# Patient Record
Sex: MALE | Race: WHITE | NOT HISPANIC OR LATINO
[De-identification: names, ages, dates, MRNs, and addresses within clinical notes are randomized per-mention and may not be internally consistent; named-entity substitution may affect disease eponyms.]

---

## 2017-05-23 ENCOUNTER — TRANSCRIPTION ENCOUNTER (OUTPATIENT)
Age: 42
End: 2017-05-23

## 2017-11-18 ENCOUNTER — TRANSCRIPTION ENCOUNTER (OUTPATIENT)
Age: 42
End: 2017-11-18

## 2018-06-05 ENCOUNTER — APPOINTMENT (OUTPATIENT)
Dept: NEUROLOGY | Facility: CLINIC | Age: 43
End: 2018-06-05
Payer: COMMERCIAL

## 2018-06-05 VITALS
SYSTOLIC BLOOD PRESSURE: 128 MMHG | HEIGHT: 68 IN | DIASTOLIC BLOOD PRESSURE: 70 MMHG | WEIGHT: 260 LBS | HEART RATE: 78 BPM | BODY MASS INDEX: 39.4 KG/M2

## 2018-06-05 DIAGNOSIS — M10.9 GOUT, UNSPECIFIED: ICD-10-CM

## 2018-06-05 DIAGNOSIS — Z78.9 OTHER SPECIFIED HEALTH STATUS: ICD-10-CM

## 2018-06-05 DIAGNOSIS — Z85.72 PERSONAL HISTORY OF NON-HODGKIN LYMPHOMAS: ICD-10-CM

## 2018-06-05 DIAGNOSIS — G47.33 OBSTRUCTIVE SLEEP APNEA (ADULT) (PEDIATRIC): ICD-10-CM

## 2018-06-05 DIAGNOSIS — E11.9 TYPE 2 DIABETES MELLITUS W/OUT COMPLICATIONS: ICD-10-CM

## 2018-06-05 DIAGNOSIS — Q85.9 PHAKOMATOSIS, UNSPECIFIED: ICD-10-CM

## 2018-06-05 PROCEDURE — 99204 OFFICE O/P NEW MOD 45 MIN: CPT

## 2018-08-03 ENCOUNTER — APPOINTMENT (OUTPATIENT)
Dept: RADIOLOGY | Facility: CLINIC | Age: 43
End: 2018-08-03

## 2018-08-05 ENCOUNTER — APPOINTMENT (OUTPATIENT)
Dept: MRI IMAGING | Facility: CLINIC | Age: 43
End: 2018-08-05

## 2018-08-11 ENCOUNTER — APPOINTMENT (OUTPATIENT)
Dept: RADIOLOGY | Facility: CLINIC | Age: 43
End: 2018-08-11

## 2018-08-11 ENCOUNTER — OUTPATIENT (OUTPATIENT)
Dept: OUTPATIENT SERVICES | Facility: HOSPITAL | Age: 43
LOS: 1 days | End: 2018-08-11
Payer: COMMERCIAL

## 2018-08-11 DIAGNOSIS — Z00.8 ENCOUNTER FOR OTHER GENERAL EXAMINATION: ICD-10-CM

## 2018-08-11 PROCEDURE — 77080 DXA BONE DENSITY AXIAL: CPT | Mod: 26

## 2018-08-11 PROCEDURE — 77080 DXA BONE DENSITY AXIAL: CPT

## 2018-08-19 ENCOUNTER — FORM ENCOUNTER (OUTPATIENT)
Age: 43
End: 2018-08-19

## 2018-08-20 ENCOUNTER — APPOINTMENT (OUTPATIENT)
Dept: MRI IMAGING | Facility: CLINIC | Age: 43
End: 2018-08-20
Payer: COMMERCIAL

## 2018-08-20 ENCOUNTER — OUTPATIENT (OUTPATIENT)
Dept: OUTPATIENT SERVICES | Facility: HOSPITAL | Age: 43
LOS: 1 days | End: 2018-08-20
Payer: COMMERCIAL

## 2018-08-20 DIAGNOSIS — Q85.9 PHAKOMATOSIS, UNSPECIFIED: ICD-10-CM

## 2018-08-20 PROCEDURE — 70553 MRI BRAIN STEM W/O & W/DYE: CPT

## 2018-08-20 PROCEDURE — 70553 MRI BRAIN STEM W/O & W/DYE: CPT | Mod: 26

## 2018-08-20 PROCEDURE — A9585: CPT

## 2019-06-04 ENCOUNTER — APPOINTMENT (OUTPATIENT)
Dept: NEUROLOGY | Facility: CLINIC | Age: 44
End: 2019-06-04

## 2020-04-25 ENCOUNTER — MESSAGE (OUTPATIENT)
Age: 45
End: 2020-04-25

## 2020-05-07 ENCOUNTER — APPOINTMENT (OUTPATIENT)
Dept: DISASTER EMERGENCY | Facility: CLINIC | Age: 45
End: 2020-05-07

## 2020-05-08 LAB
SARS-COV-2 IGG SERPL IA-ACNC: <0.1 INDEX
SARS-COV-2 IGG SERPL QL IA: NEGATIVE

## 2020-11-07 ENCOUNTER — TRANSCRIPTION ENCOUNTER (OUTPATIENT)
Age: 45
End: 2020-11-07

## 2021-12-05 ENCOUNTER — NON-APPOINTMENT (OUTPATIENT)
Age: 46
End: 2021-12-05

## 2021-12-09 ENCOUNTER — NON-APPOINTMENT (OUTPATIENT)
Age: 46
End: 2021-12-09

## 2021-12-09 ENCOUNTER — APPOINTMENT (OUTPATIENT)
Dept: OPHTHALMOLOGY | Facility: CLINIC | Age: 46
End: 2021-12-09
Payer: COMMERCIAL

## 2021-12-09 PROCEDURE — 92004 COMPRE OPH EXAM NEW PT 1/>: CPT

## 2021-12-09 PROCEDURE — 92133 CPTRZD OPH DX IMG PST SGM ON: CPT

## 2021-12-09 PROCEDURE — 92012 INTRM OPH EXAM EST PATIENT: CPT

## 2021-12-28 ENCOUNTER — TRANSCRIPTION ENCOUNTER (OUTPATIENT)
Age: 46
End: 2021-12-28

## 2021-12-28 ENCOUNTER — APPOINTMENT (OUTPATIENT)
Dept: VASCULAR SURGERY | Facility: CLINIC | Age: 46
End: 2021-12-28

## 2021-12-28 ENCOUNTER — LABORATORY RESULT (OUTPATIENT)
Age: 46
End: 2021-12-28

## 2021-12-28 DIAGNOSIS — J45.909 UNSPECIFIED ASTHMA, UNCOMPLICATED: ICD-10-CM

## 2022-09-02 ENCOUNTER — NON-APPOINTMENT (OUTPATIENT)
Age: 47
End: 2022-09-02

## 2022-10-13 ENCOUNTER — INPATIENT (INPATIENT)
Facility: HOSPITAL | Age: 47
LOS: 4 days | Discharge: HOME | DRG: 392 | End: 2022-10-18
Attending: SURGERY | Admitting: SURGERY
Payer: COMMERCIAL

## 2022-10-13 VITALS
WEIGHT: 241.41 LBS | OXYGEN SATURATION: 96 % | HEART RATE: 105 BPM | SYSTOLIC BLOOD PRESSURE: 115 MMHG | DIASTOLIC BLOOD PRESSURE: 80 MMHG | HEIGHT: 68 IN | RESPIRATION RATE: 20 BRPM | TEMPERATURE: 98 F

## 2022-10-13 DIAGNOSIS — K57.32 DIVERTICULITIS OF LARGE INTESTINE WITHOUT PERFORATION OR ABSCESS WITHOUT BLEEDING: ICD-10-CM

## 2022-10-13 DIAGNOSIS — D49.89 NEOPLASM OF UNSPECIFIED BEHAVIOR OF OTHER SPECIFIED SITES: Chronic | ICD-10-CM

## 2022-10-13 LAB
ALBUMIN SERPL ELPH-MCNC: 4.5 G/DL — SIGNIFICANT CHANGE UP (ref 3.3–5)
ALP SERPL-CCNC: 62 U/L — SIGNIFICANT CHANGE UP (ref 30–120)
ALT FLD-CCNC: 33 U/L DA — SIGNIFICANT CHANGE UP (ref 10–60)
ANION GAP SERPL CALC-SCNC: 9 MMOL/L — SIGNIFICANT CHANGE UP (ref 5–17)
APPEARANCE UR: CLEAR — SIGNIFICANT CHANGE UP
AST SERPL-CCNC: 22 U/L — SIGNIFICANT CHANGE UP (ref 10–40)
BASOPHILS # BLD AUTO: 0.07 K/UL — SIGNIFICANT CHANGE UP (ref 0–0.2)
BASOPHILS NFR BLD AUTO: 0.5 % — SIGNIFICANT CHANGE UP (ref 0–2)
BILIRUB SERPL-MCNC: 0.7 MG/DL — SIGNIFICANT CHANGE UP (ref 0.2–1.2)
BILIRUB UR-MCNC: NEGATIVE — SIGNIFICANT CHANGE UP
BUN SERPL-MCNC: 14 MG/DL — SIGNIFICANT CHANGE UP (ref 7–23)
CALCIUM SERPL-MCNC: 9.7 MG/DL — SIGNIFICANT CHANGE UP (ref 8.4–10.5)
CHLORIDE SERPL-SCNC: 100 MMOL/L — SIGNIFICANT CHANGE UP (ref 96–108)
CO2 SERPL-SCNC: 29 MMOL/L — SIGNIFICANT CHANGE UP (ref 22–31)
COLOR SPEC: YELLOW — SIGNIFICANT CHANGE UP
CREAT SERPL-MCNC: 0.86 MG/DL — SIGNIFICANT CHANGE UP (ref 0.5–1.3)
DIFF PNL FLD: NEGATIVE — SIGNIFICANT CHANGE UP
EGFR: 109 ML/MIN/1.73M2 — SIGNIFICANT CHANGE UP
EOSINOPHIL # BLD AUTO: 0.12 K/UL — SIGNIFICANT CHANGE UP (ref 0–0.5)
EOSINOPHIL NFR BLD AUTO: 0.9 % — SIGNIFICANT CHANGE UP (ref 0–6)
GLUCOSE SERPL-MCNC: 94 MG/DL — SIGNIFICANT CHANGE UP (ref 70–99)
GLUCOSE UR QL: NEGATIVE MG/DL — SIGNIFICANT CHANGE UP
HCT VFR BLD CALC: 44.8 % — SIGNIFICANT CHANGE UP (ref 39–50)
HGB BLD-MCNC: 15 G/DL — SIGNIFICANT CHANGE UP (ref 13–17)
IMM GRANULOCYTES NFR BLD AUTO: 0.6 % — SIGNIFICANT CHANGE UP (ref 0–0.9)
KETONES UR-MCNC: ABNORMAL
LACTATE SERPL-SCNC: 0.7 MMOL/L — SIGNIFICANT CHANGE UP (ref 0.7–2)
LEUKOCYTE ESTERASE UR-ACNC: ABNORMAL
LIDOCAIN IGE QN: 88 U/L — SIGNIFICANT CHANGE UP (ref 73–393)
LYMPHOCYTES # BLD AUTO: 15.5 % — SIGNIFICANT CHANGE UP (ref 13–44)
LYMPHOCYTES # BLD AUTO: 2.06 K/UL — SIGNIFICANT CHANGE UP (ref 1–3.3)
MCHC RBC-ENTMCNC: 28.1 PG — SIGNIFICANT CHANGE UP (ref 27–34)
MCHC RBC-ENTMCNC: 33.5 GM/DL — SIGNIFICANT CHANGE UP (ref 32–36)
MCV RBC AUTO: 84.1 FL — SIGNIFICANT CHANGE UP (ref 80–100)
MONOCYTES # BLD AUTO: 0.8 K/UL — SIGNIFICANT CHANGE UP (ref 0–0.9)
MONOCYTES NFR BLD AUTO: 6 % — SIGNIFICANT CHANGE UP (ref 2–14)
NEUTROPHILS # BLD AUTO: 10.14 K/UL — HIGH (ref 1.8–7.4)
NEUTROPHILS NFR BLD AUTO: 76.5 % — SIGNIFICANT CHANGE UP (ref 43–77)
NITRITE UR-MCNC: NEGATIVE — SIGNIFICANT CHANGE UP
NRBC # BLD: 0 /100 WBCS — SIGNIFICANT CHANGE UP (ref 0–0)
PH UR: 5 — SIGNIFICANT CHANGE UP (ref 5–8)
PLATELET # BLD AUTO: 276 K/UL — SIGNIFICANT CHANGE UP (ref 150–400)
POTASSIUM SERPL-MCNC: 3.9 MMOL/L — SIGNIFICANT CHANGE UP (ref 3.5–5.3)
POTASSIUM SERPL-SCNC: 3.9 MMOL/L — SIGNIFICANT CHANGE UP (ref 3.5–5.3)
PROT SERPL-MCNC: 8.3 G/DL — SIGNIFICANT CHANGE UP (ref 6–8.3)
PROT UR-MCNC: 15 MG/DL
RBC # BLD: 5.33 M/UL — SIGNIFICANT CHANGE UP (ref 4.2–5.8)
RBC # FLD: 13 % — SIGNIFICANT CHANGE UP (ref 10.3–14.5)
SARS-COV-2 RNA SPEC QL NAA+PROBE: SIGNIFICANT CHANGE UP
SODIUM SERPL-SCNC: 138 MMOL/L — SIGNIFICANT CHANGE UP (ref 135–145)
SP GR SPEC: 1.02 — SIGNIFICANT CHANGE UP (ref 1.01–1.02)
UROBILINOGEN FLD QL: NEGATIVE MG/DL — SIGNIFICANT CHANGE UP
WBC # BLD: 13.27 K/UL — HIGH (ref 3.8–10.5)
WBC # FLD AUTO: 13.27 K/UL — HIGH (ref 3.8–10.5)

## 2022-10-13 PROCEDURE — 99223 1ST HOSP IP/OBS HIGH 75: CPT

## 2022-10-13 PROCEDURE — G1004: CPT

## 2022-10-13 PROCEDURE — 99222 1ST HOSP IP/OBS MODERATE 55: CPT

## 2022-10-13 PROCEDURE — 99285 EMERGENCY DEPT VISIT HI MDM: CPT

## 2022-10-13 PROCEDURE — 74177 CT ABD & PELVIS W/CONTRAST: CPT | Mod: 26,ME

## 2022-10-13 RX ORDER — PIPERACILLIN AND TAZOBACTAM 4; .5 G/20ML; G/20ML
3.38 INJECTION, POWDER, LYOPHILIZED, FOR SOLUTION INTRAVENOUS ONCE
Refills: 0 | Status: COMPLETED | OUTPATIENT
Start: 2022-10-13 | End: 2022-10-13

## 2022-10-13 RX ORDER — SODIUM CHLORIDE 9 MG/ML
1000 INJECTION INTRAMUSCULAR; INTRAVENOUS; SUBCUTANEOUS
Refills: 0 | Status: DISCONTINUED | OUTPATIENT
Start: 2022-10-13 | End: 2022-10-17

## 2022-10-13 RX ORDER — MORPHINE SULFATE 50 MG/1
4 CAPSULE, EXTENDED RELEASE ORAL ONCE
Refills: 0 | Status: DISCONTINUED | OUTPATIENT
Start: 2022-10-13 | End: 2022-10-13

## 2022-10-13 RX ORDER — PANTOPRAZOLE SODIUM 20 MG/1
40 TABLET, DELAYED RELEASE ORAL DAILY
Refills: 0 | Status: DISCONTINUED | OUTPATIENT
Start: 2022-10-13 | End: 2022-10-18

## 2022-10-13 RX ORDER — ONDANSETRON 8 MG/1
4 TABLET, FILM COATED ORAL ONCE
Refills: 0 | Status: COMPLETED | OUTPATIENT
Start: 2022-10-13 | End: 2022-10-13

## 2022-10-13 RX ORDER — BUDESONIDE AND FORMOTEROL FUMARATE DIHYDRATE 160; 4.5 UG/1; UG/1
2 AEROSOL RESPIRATORY (INHALATION)
Refills: 0 | Status: DISCONTINUED | OUTPATIENT
Start: 2022-10-13 | End: 2022-10-18

## 2022-10-13 RX ORDER — ENOXAPARIN SODIUM 100 MG/ML
40 INJECTION SUBCUTANEOUS EVERY 24 HOURS
Refills: 0 | Status: DISCONTINUED | OUTPATIENT
Start: 2022-10-13 | End: 2022-10-18

## 2022-10-13 RX ORDER — LOSARTAN POTASSIUM 100 MG/1
50 TABLET, FILM COATED ORAL DAILY
Refills: 0 | Status: DISCONTINUED | OUTPATIENT
Start: 2022-10-13 | End: 2022-10-18

## 2022-10-13 RX ORDER — DOCUSATE SODIUM 100 MG
100 CAPSULE ORAL THREE TIMES A DAY
Refills: 0 | Status: DISCONTINUED | OUTPATIENT
Start: 2022-10-13 | End: 2022-10-18

## 2022-10-13 RX ORDER — SIMVASTATIN 20 MG/1
20 TABLET, FILM COATED ORAL AT BEDTIME
Refills: 0 | Status: DISCONTINUED | OUTPATIENT
Start: 2022-10-13 | End: 2022-10-18

## 2022-10-13 RX ORDER — SODIUM CHLORIDE 9 MG/ML
1000 INJECTION INTRAMUSCULAR; INTRAVENOUS; SUBCUTANEOUS ONCE
Refills: 0 | Status: COMPLETED | OUTPATIENT
Start: 2022-10-13 | End: 2022-10-13

## 2022-10-13 RX ORDER — ACETAMINOPHEN 500 MG
650 TABLET ORAL EVERY 6 HOURS
Refills: 0 | Status: DISCONTINUED | OUTPATIENT
Start: 2022-10-13 | End: 2022-10-18

## 2022-10-13 RX ORDER — MONTELUKAST 4 MG/1
10 TABLET, CHEWABLE ORAL DAILY
Refills: 0 | Status: DISCONTINUED | OUTPATIENT
Start: 2022-10-13 | End: 2022-10-18

## 2022-10-13 RX ORDER — LANOLIN ALCOHOL/MO/W.PET/CERES
5 CREAM (GRAM) TOPICAL AT BEDTIME
Refills: 0 | Status: DISCONTINUED | OUTPATIENT
Start: 2022-10-13 | End: 2022-10-18

## 2022-10-13 RX ORDER — KETOROLAC TROMETHAMINE 30 MG/ML
30 SYRINGE (ML) INJECTION EVERY 6 HOURS
Refills: 0 | Status: DISCONTINUED | OUTPATIENT
Start: 2022-10-13 | End: 2022-10-13

## 2022-10-13 RX ORDER — PIPERACILLIN AND TAZOBACTAM 4; .5 G/20ML; G/20ML
3.38 INJECTION, POWDER, LYOPHILIZED, FOR SOLUTION INTRAVENOUS EVERY 8 HOURS
Refills: 0 | Status: DISCONTINUED | OUTPATIENT
Start: 2022-10-13 | End: 2022-10-18

## 2022-10-13 RX ORDER — TIOTROPIUM BROMIDE 18 UG/1
1 CAPSULE ORAL; RESPIRATORY (INHALATION) DAILY
Refills: 0 | Status: DISCONTINUED | OUTPATIENT
Start: 2022-10-13 | End: 2022-10-18

## 2022-10-13 RX ORDER — HYDROMORPHONE HYDROCHLORIDE 2 MG/ML
0.5 INJECTION INTRAMUSCULAR; INTRAVENOUS; SUBCUTANEOUS
Refills: 0 | Status: DISCONTINUED | OUTPATIENT
Start: 2022-10-13 | End: 2022-10-18

## 2022-10-13 RX ADMIN — SODIUM CHLORIDE 1000 MILLILITER(S): 9 INJECTION INTRAMUSCULAR; INTRAVENOUS; SUBCUTANEOUS at 11:57

## 2022-10-13 RX ADMIN — SIMVASTATIN 20 MILLIGRAM(S): 20 TABLET, FILM COATED ORAL at 22:34

## 2022-10-13 RX ADMIN — SODIUM CHLORIDE 1000 MILLILITER(S): 9 INJECTION INTRAMUSCULAR; INTRAVENOUS; SUBCUTANEOUS at 12:46

## 2022-10-13 RX ADMIN — MORPHINE SULFATE 4 MILLIGRAM(S): 50 CAPSULE, EXTENDED RELEASE ORAL at 12:05

## 2022-10-13 RX ADMIN — SODIUM CHLORIDE 125 MILLILITER(S): 9 INJECTION INTRAMUSCULAR; INTRAVENOUS; SUBCUTANEOUS at 22:34

## 2022-10-13 RX ADMIN — Medication 100 MILLIGRAM(S): at 22:36

## 2022-10-13 RX ADMIN — HYDROMORPHONE HYDROCHLORIDE 0.5 MILLIGRAM(S): 2 INJECTION INTRAMUSCULAR; INTRAVENOUS; SUBCUTANEOUS at 15:39

## 2022-10-13 RX ADMIN — MORPHINE SULFATE 4 MILLIGRAM(S): 50 CAPSULE, EXTENDED RELEASE ORAL at 14:14

## 2022-10-13 RX ADMIN — SODIUM CHLORIDE 125 MILLILITER(S): 9 INJECTION INTRAMUSCULAR; INTRAVENOUS; SUBCUTANEOUS at 15:32

## 2022-10-13 RX ADMIN — ONDANSETRON 4 MILLIGRAM(S): 8 TABLET, FILM COATED ORAL at 11:56

## 2022-10-13 RX ADMIN — MORPHINE SULFATE 4 MILLIGRAM(S): 50 CAPSULE, EXTENDED RELEASE ORAL at 11:55

## 2022-10-13 RX ADMIN — MORPHINE SULFATE 4 MILLIGRAM(S): 50 CAPSULE, EXTENDED RELEASE ORAL at 14:04

## 2022-10-13 RX ADMIN — PIPERACILLIN AND TAZOBACTAM 200 GRAM(S): 4; .5 INJECTION, POWDER, LYOPHILIZED, FOR SOLUTION INTRAVENOUS at 14:02

## 2022-10-13 RX ADMIN — PIPERACILLIN AND TAZOBACTAM 25 GRAM(S): 4; .5 INJECTION, POWDER, LYOPHILIZED, FOR SOLUTION INTRAVENOUS at 22:34

## 2022-10-13 NOTE — ED ADULT NURSE NOTE - IS THE PATIENT ABLE TO BE SCREENED?
No Call  No Show  No Charge    Jeb Reyes no showed 09/15/22 appointment  She is transferred to receive services from a SW, as discussed earlier this year, due to be under Medicare from Oct       Treatment Plan not due at this session 
Yes

## 2022-10-13 NOTE — CONSULT NOTE ADULT - ASSESSMENT
47M with HTN, DM2, Asthma, VIOLETA, and Gout admitted for Acute Sigmoid Diverticulitis     Acute Sigmoid Diverticulitis   NPO + IV Zosyn + Pain control   Imaging reviewed   Non operative management as of now   Management as per surgery     HTN / HLD  Continue Losartan + Statin    DM2  Hold Metformin  ISS and maintain BS < 180    Asthma / VIOLETA  Severe Asthma; Will continue home inhalers   Uses CPAP at bedtime 15/12    Gout  Hold meds    Diet  Regular    DVT Prophylaxis  Lovenox    Disposition   Discharge planning pending hospital course

## 2022-10-13 NOTE — H&P ADULT - ASSESSMENT
47 year old male admitted with acute sigmoid acute diverticulitis confirmed on CT scan    NPO with Ice chips and sips and medications  IV fluids  IV antibiotics  DVT/GI prophylaxis  serial abdominal exams  follow blood works  No acute surgical intervention at this time, will proceed with conservative management  Case and plan D/W Dr. Grover 47 year old male admitted with acute sigmoid acute diverticulitis confirmed on CT scan    NPO with Ice chips and sips and medications  IV fluids  IV antibiotics  DVT/GI prophylaxis  serial abdominal exams  follow blood works  No acute surgical intervention at this time, will proceed with conservative management  Hospitalist contacted for co-management  Case and plan D/W Dr. Grover 47 year old male admitted with acute sigmoid acute diverticulitis confirmed on CT scan  NPO with Ice chips and sips and medications  IV fluids, IV antibiotics, DVT/GI prophylaxis ,serial abdominal exams, follow blood works  No acute surgical intervention at this time, will proceed with conservative management  Hospitalist contacted for co-management  Case and plan D/W Dr. Scott    As in above PA notation.    Mr. Whyte personally seen and examined in ER and then on floor this PM.  Vitals non-suggestive overall, though low grade tachycardia noted.  Abdomen with significant tenderness localized to LLQ without diffuse tenderness or sree peritonitis.  Labs with leukocytosis without acidosis or evidence of end organ failure.  CT with evidence of acute sigmoid diverticulitis with some fluid, but no free air/ obvious microperforation.  As such, clinically appropriate for admission and medical management.  Surgically, stable for present without indication for immediate intervention.    Hospitalist consult appreciated.

## 2022-10-13 NOTE — ED ADULT TRIAGE NOTE - HISTORY OF COVID-19 VACCINATION
[facility]    Progress Note    Dalton Griffin Patient Status:  Inpatient    1965 MRN [id number]   Location [facility] 4W/SW/SE Attending Wood Rodriguez MD   Hosp Day # 3 PCP Tyree Medina MD       Subjective:   Gunner Farmer 39.5   MCV 89.6   MCH 30.2   MCHC 33.7   RDW 16.5*   NEPRELIM 8.24*   WBC 10.6   .0       Recent Labs   Lab 09/03/19  1557 09/04/19  1220 09/07/19  0539   GLU 91 159* 142*   BUN 13 12 25*   CREATSERUM 1.03 0.84 1.03   GFRAA 97 117 97   GFRNAA 84 101 Yes

## 2022-10-13 NOTE — CONSULT NOTE ADULT - SUBJECTIVE AND OBJECTIVE BOX
Subjective: 47M with HTN, DM2, Asthma, VIOLETA, and Gout admitted for Acute Diverticulitis and surgery requesting medicine consult. Patient states he was feeling abdominal discomfort for the past few days that progressively worsened. Initially started off as cramping that diffusely spread over the abdomen. No nausea or vomiting  Reports subjective fevers, with chills and night sweats last night.  Patient came to the ED and was evaluated with labs and imaging which was conclusive for Acute Sigmoid Diverticulitis.  Received IV Zosyn along with pain control is resting in stretcher comfortable.     MEDICATIONS  (STANDING):  docusate sodium 100 milliGRAM(s) Oral three times a day  enoxaparin Injectable 40 milliGRAM(s) SubCutaneous every 24 hours  pantoprazole  Injectable 40 milliGRAM(s) IV Push daily  piperacillin/tazobactam IVPB.. 3.375 Gram(s) IV Intermittent every 8 hours  sodium chloride 0.9%. 1000 milliLiter(s) (125 mL/Hr) IV Continuous <Continuous>    MEDICATIONS  (PRN):  acetaminophen     Tablet .. 650 milliGRAM(s) Oral every 6 hours PRN Temp greater or equal to 38C (100.4F), Mild Pain (1 - 3)  HYDROmorphone  Injectable 0.5 milliGRAM(s) IV Push every 3 hours PRN Severe Pain (7 - 10)  ketorolac   Injectable 30 milliGRAM(s) IV Push every 6 hours PRN Moderate Pain (4 - 6)  melatonin 5 milliGRAM(s) Oral at bedtime PRN Insomnia      Allergies    No Known Allergies    Intolerances        Vital Signs Last 24 Hrs  T(C): 36.7 (13 Oct 2022 13:34), Max: 36.7 (13 Oct 2022 11:20)  T(F): 98 (13 Oct 2022 13:34), Max: 98 (13 Oct 2022 11:20)  HR: 105 (13 Oct 2022 13:34) (105 - 105)  BP: 115/80 (13 Oct 2022 13:34) (115/80 - 115/80)  BP(mean): 91 (13 Oct 2022 13:34) (91 - 91)  RR: 20 (13 Oct 2022 13:34) (20 - 20)  SpO2: 96% (13 Oct 2022 13:34) (96% - 96%)    Parameters below as of 13 Oct 2022 13:34  Patient On (Oxygen Delivery Method): room air        PHYSICAL EXAM:  GENERAL: NAD, well-groomed, well-developed  HEAD:  Atraumatic, Normocephalic  ENMT: Moist mucous membranes,   NECK: Supple, No JVD, Normal thyroid  NERVOUS SYSTEM:  All 4 extremities mobile, no gross sensory deficits.   CHEST/LUNG: Clear to auscultation bilaterally; No rales, rhonchi, wheezing, or rubs  HEART: Regular rate and rhythm; No murmurs, rubs, or gallops  ABDOMEN: Soft, Diffuse Tenderness   EXTREMITIES:  2+ Peripheral Pulses, No clubbing, cyanosis, or edema      LABS:                        15.0   13.27 )-----------( 276      ( 13 Oct 2022 12:01 )             44.8     13 Oct 2022 12:01    138    |  100    |  14     ----------------------------<  94     3.9     |  29     |  0.86     Ca    9.7        13 Oct 2022 12:01    TPro  8.3    /  Alb  4.5    /  TBili  0.7    /  DBili  x      /  AST  22     /  ALT  33     /  AlkPhos  62     13 Oct 2022 12:01      Urinalysis Basic - ( 13 Oct 2022 11:30 )    Color: Yellow / Appearance: Clear / S.025 / pH: x  Gluc: x / Ketone: Trace  / Bili: Negative / Urobili: Negative mg/dL   Blood: x / Protein: 15 mg/dL / Nitrite: Negative   Leuk Esterase: Trace / RBC: 0-2 /HPF / WBC 0-2   Sq Epi: x / Non Sq Epi: Moderate / Bacteria: Few      CAPILLARY BLOOD GLUCOSE          RADIOLOGY & ADDITIONAL TESTS:    Imaging Personally Reviewed:  [ ] YES     Consultant(s) Notes Reviewed:      Care Discussed with Consultants/Other Providers:    Advanced Directives: [ ] DNR  [ ] No feeding tube  [ ] MOLST in chart  [ ] MOLST completed today  [ ] Unknown

## 2022-10-13 NOTE — ED PROVIDER NOTE - CLINICAL SUMMARY MEDICAL DECISION MAKING FREE TEXT BOX
Adrian Escudero for Dr. Claire     47 year old male with PMHx of asthma, DM, gout, HTN, and HLD presents to the ED complaining of lower abdominal pain that starting suddenly yesterday around 2pm. Pt had diarrhea x 8 yesterday morning and noted sweats, fever, and chills last night. No history of abdominal surgery or colonoscopy. Denies blood in BMs, vomiting, nausea, hematuria, dysuria, frequency, urgency, chest pain, SOB, or other symptoms. Adrian Escudero for Dr. Claire     47 year old male with PMHx of asthma, DM, gout, HTN, and HLD presents to the ED complaining of lower abdominal pain that starting suddenly yesterday around 2pm. Pt had diarrhea x 8 yesterday morning and noted sweats, fever, and chills last night. No history of abdominal surgery or colonoscopy. Denies blood in BMs, vomiting, nausea, hematuria, dysuria, frequency, urgency, chest pain, SOB, or other symptoms.    Patient is a 47-year-old male who presents to the emergency room with a chief complaints of left lower quadrant pain.  Past medical history of asthma, diabetes, gout, Hodgkin's lymphoma, obstructive sleep apnea.  Patient reports that last night he began to experience lower abdominal pain worse on the left side.  He also reports that he has been experiencing nonbloody diarrhea since yesterday with decreased p.o. intake.  He took Gas-X and Pepto-Bismol without relief of symptoms.  Denies any prior similar episodes and has no known abdominal pathology.  Denies nausea vomiting chest pain shortness of breath.  Surgical service was made aware of patient prior to arrival was at bedside to examine.  On exam patient is lying in bed not appear to be in any acute distress although does appear to be mildly uncomfortable.  Normocephalic atraumatic pupils are equal round and reactive heart is regular with rapid rate lungs are clear to auscultation abdomen is soft with tenderness to palpation to the left lower quadrant.  No skin changes noted.  No focal neurologic deficits.  Patient's presenting the emergency room with a chief complaints of left lower quadrant pain.  Concern for possible colitis versus diverticulitis versus additional abdominal pathology.  Will obtain screening labs EKG CT abdomen pelvis medicate as needed for symptoms and monitor.  Patient may require admission for further management

## 2022-10-13 NOTE — H&P ADULT - TIME BILLING
Reviewed with patient in detail, wife at bedside, Hospitalist attending, Surgical team and today's RN team.

## 2022-10-13 NOTE — H&P ADULT - HISTORY OF PRESENT ILLNESS
47 year old male presents to [place] ER with compliant of left lower abdominal pain for approx 2 days. Admits to some diarrhea yesterday and decreased oral intake.  Patient states he took Gas-X and Peptobismol without relief.  Patient denies history of similar symptoms, denies N/V chest pain, shortness of breath, dizzyness or other complaints at this time.   No prior colonoscopy

## 2022-10-13 NOTE — ED PROVIDER NOTE - NSFOLLOWUPINSTRUCTIONS_ED_ALL_ED_FT
Diverticulitis is when small pouches in your colon (large intestine) get infected or swollen. This causes pain in the belly (abdomen) and watery poop (diarrhea).    These pouches are called diverticula. The pouches form in people who have a condition called diverticulosis.      What are the causes?    This condition may be caused by poop (stool) that gets trapped in the pouches in your colon. The poop lets germs (bacteria) grow in the pouches. This causes the infection.      What increases the risk?    You are more likely to get this condition if you have small pouches in your colon. The risk is higher if:  •You are overweight or very overweight (obese).      •You do not exercise enough.      •You drink alcohol.      •You smoke or use products with tobacco in them.      •You eat a diet that has a lot of red meat such as beef, pork, or lamb.      •You eat a diet that does not have enough fiber in it.      •You are older than 40 years of age.        What are the signs or symptoms?    •Pain in the belly. Pain is often on the left side, but it may be in other areas.      •Fever and feeling cold.      •Feeling like you may vomit.      •Vomiting.      •Having cramps.      •Feeling full.      •Changes to how often you poop.      •Blood in your poop.        How is this treated?    Most cases are treated at home by:  •Taking over-the-counter pain medicines.      •Following a clear liquid diet.      •Taking antibiotic medicines.      •Resting.      Very bad cases may need to be treated at a hospital. This may include:  •Not eating or drinking.      •Taking prescription pain medicine.      •Getting antibiotic medicines through an IV tube.      •Getting fluid and food through an IV tube.      •Having surgery.      When you are feeling better, your doctor may tell you to have a test to check your colon (colonoscopy).      Follow these instructions at home:    Medicines   •Take over-the-counter and prescription medicines only as told by your doctor. These include:  •Antibiotics.      •Pain medicines.      •Fiber pills.      •Probiotics.      •Stool softeners.        •If you were prescribed an antibiotic medicine, take it as told by your doctor. Do not stop taking the antibiotic even if you start to feel better.      •Ask your doctor if the medicine prescribed to you requires you to avoid driving or using machinery.        Eating and drinking      •Follow a diet as told by your doctor.    •When you feel better, your doctor may tell you to change your diet. You may need to eat a lot of fiber. Fiber makes it easier to poop (have a bowel movement). Foods with fiber include:  •Berries.      •Beans.      •Lentils.      •Green vegetables.        •Avoid eating red meat.      General instructions     • Do not use any products that contain nicotine or tobacco, such as cigarettes, e-cigarettes, and chewing tobacco. If you need help quitting, ask your doctor.      •Exercise 3 or more times a week. Try to get 30 minutes each time. Exercise enough to sweat and make your heart beat faster.      •Keep all follow-up visits as told by your doctor. This is important.        Contact a doctor if:    •Your pain does not get better.      •You are not pooping like normal.        Get help right away if:    •Your pain gets worse.      •Your symptoms do not get better.      •Your symptoms get worse very fast.      •You have a fever.      •You vomit more than one time.    •You have poop that is:  •Bloody.      •Black.      •Tarry.          Summary    •This condition happens when small pouches in your colon get infected or swollen.      •Take medicines only as told by your doctor.      •Follow a diet as told by your doctor.      •Keep all follow-up visits as told by your doctor. This is important.      This information is not intended to replace advice given to you by your health care provider. Make sure you discuss any questions you have with your health care provider.

## 2022-10-13 NOTE — ED ADULT NURSE NOTE - OBJECTIVE STATEMENT
47 M c/o lower abdominal pain L>R since yesterday afternoon . Was having nonbloody diarrhea yesterday. Dec PO intake. Took gas x and pepto bismol without relief. Denies hx similar symptoms,  Denies f/c, cp, sob, uri symptoms, n/v. +Decreased urination.

## 2022-10-13 NOTE — H&P ADULT - NSICDXPASTMEDICALHX_GEN_ALL_CORE_FT
PAST MEDICAL HISTORY:  Asthma     Diabetes mellitus     Gout     Lymphoma, Hodgkin's     VIOLETA (obstructive sleep apnea)

## 2022-10-13 NOTE — ED PROVIDER NOTE - NS ED ATTENDING STATEMENT MOD
This was a shared visit with the KRUPA. I reviewed and verified the documentation and independently performed the documented:

## 2022-10-13 NOTE — ED PROVIDER NOTE - OBJECTIVE STATEMENT
47 M hx asthma, DM, gout, hld c/o lower abdominal pain L>R since last night. Was having nonbloody diarrhea yesterday. Dec PO intake. Took gas x and pepto bismol without relief. Denies hx similar symptoms, prior abd surgery. Denies f/c, cp, sob, uri symptoms, n/v. +Decreased urination.     pmd g digiovanna

## 2022-10-14 LAB
ALBUMIN SERPL ELPH-MCNC: 3.3 G/DL — SIGNIFICANT CHANGE UP (ref 3.3–5)
ALP SERPL-CCNC: 52 U/L — SIGNIFICANT CHANGE UP (ref 30–120)
ALT FLD-CCNC: 23 U/L DA — SIGNIFICANT CHANGE UP (ref 10–60)
ANION GAP SERPL CALC-SCNC: 11 MMOL/L — SIGNIFICANT CHANGE UP (ref 5–17)
APTT BLD: 33.2 SEC — SIGNIFICANT CHANGE UP (ref 27.5–35.5)
AST SERPL-CCNC: 12 U/L — SIGNIFICANT CHANGE UP (ref 10–40)
BASOPHILS # BLD AUTO: 0.08 K/UL — SIGNIFICANT CHANGE UP (ref 0–0.2)
BASOPHILS NFR BLD AUTO: 0.8 % — SIGNIFICANT CHANGE UP (ref 0–2)
BILIRUB SERPL-MCNC: 0.8 MG/DL — SIGNIFICANT CHANGE UP (ref 0.2–1.2)
BLD GP AB SCN SERPL QL: SIGNIFICANT CHANGE UP
BUN SERPL-MCNC: 12 MG/DL — SIGNIFICANT CHANGE UP (ref 7–23)
CALCIUM SERPL-MCNC: 8.2 MG/DL — LOW (ref 8.4–10.5)
CHLORIDE SERPL-SCNC: 102 MMOL/L — SIGNIFICANT CHANGE UP (ref 96–108)
CO2 SERPL-SCNC: 24 MMOL/L — SIGNIFICANT CHANGE UP (ref 22–31)
CREAT SERPL-MCNC: 0.77 MG/DL — SIGNIFICANT CHANGE UP (ref 0.5–1.3)
EGFR: 113 ML/MIN/1.73M2 — SIGNIFICANT CHANGE UP
EOSINOPHIL # BLD AUTO: 0.14 K/UL — SIGNIFICANT CHANGE UP (ref 0–0.5)
EOSINOPHIL NFR BLD AUTO: 1.4 % — SIGNIFICANT CHANGE UP (ref 0–6)
GLUCOSE BLDC GLUCOMTR-MCNC: 80 MG/DL — SIGNIFICANT CHANGE UP (ref 70–99)
GLUCOSE SERPL-MCNC: 87 MG/DL — SIGNIFICANT CHANGE UP (ref 70–99)
HCT VFR BLD CALC: 39.9 % — SIGNIFICANT CHANGE UP (ref 39–50)
HGB BLD-MCNC: 13.1 G/DL — SIGNIFICANT CHANGE UP (ref 13–17)
IMM GRANULOCYTES NFR BLD AUTO: 0.7 % — SIGNIFICANT CHANGE UP (ref 0–0.9)
INR BLD: 1.27 RATIO — HIGH (ref 0.88–1.16)
LYMPHOCYTES # BLD AUTO: 19.4 % — SIGNIFICANT CHANGE UP (ref 13–44)
LYMPHOCYTES # BLD AUTO: 2 K/UL — SIGNIFICANT CHANGE UP (ref 1–3.3)
MCHC RBC-ENTMCNC: 28.1 PG — SIGNIFICANT CHANGE UP (ref 27–34)
MCHC RBC-ENTMCNC: 32.8 GM/DL — SIGNIFICANT CHANGE UP (ref 32–36)
MCV RBC AUTO: 85.6 FL — SIGNIFICANT CHANGE UP (ref 80–100)
MONOCYTES # BLD AUTO: 0.65 K/UL — SIGNIFICANT CHANGE UP (ref 0–0.9)
MONOCYTES NFR BLD AUTO: 6.3 % — SIGNIFICANT CHANGE UP (ref 2–14)
NEUTROPHILS # BLD AUTO: 7.39 K/UL — SIGNIFICANT CHANGE UP (ref 1.8–7.4)
NEUTROPHILS NFR BLD AUTO: 71.4 % — SIGNIFICANT CHANGE UP (ref 43–77)
NRBC # BLD: 0 /100 WBCS — SIGNIFICANT CHANGE UP (ref 0–0)
PLATELET # BLD AUTO: 218 K/UL — SIGNIFICANT CHANGE UP (ref 150–400)
POTASSIUM SERPL-MCNC: 3.9 MMOL/L — SIGNIFICANT CHANGE UP (ref 3.5–5.3)
POTASSIUM SERPL-SCNC: 3.9 MMOL/L — SIGNIFICANT CHANGE UP (ref 3.5–5.3)
PROT SERPL-MCNC: 6.8 G/DL — SIGNIFICANT CHANGE UP (ref 6–8.3)
PROTHROM AB SERPL-ACNC: 14.6 SEC — HIGH (ref 10.5–13.4)
RBC # BLD: 4.66 M/UL — SIGNIFICANT CHANGE UP (ref 4.2–5.8)
RBC # FLD: 13 % — SIGNIFICANT CHANGE UP (ref 10.3–14.5)
SODIUM SERPL-SCNC: 137 MMOL/L — SIGNIFICANT CHANGE UP (ref 135–145)
WBC # BLD: 10.33 K/UL — SIGNIFICANT CHANGE UP (ref 3.8–10.5)
WBC # FLD AUTO: 10.33 K/UL — SIGNIFICANT CHANGE UP (ref 3.8–10.5)

## 2022-10-14 PROCEDURE — 99233 SBSQ HOSP IP/OBS HIGH 50: CPT

## 2022-10-14 PROCEDURE — 99232 SBSQ HOSP IP/OBS MODERATE 35: CPT

## 2022-10-14 RX ORDER — DEXTROSE 50 % IN WATER 50 %
15 SYRINGE (ML) INTRAVENOUS ONCE
Refills: 0 | Status: DISCONTINUED | OUTPATIENT
Start: 2022-10-14 | End: 2022-10-14

## 2022-10-14 RX ORDER — GLUCAGON INJECTION, SOLUTION 0.5 MG/.1ML
1 INJECTION, SOLUTION SUBCUTANEOUS ONCE
Refills: 0 | Status: DISCONTINUED | OUTPATIENT
Start: 2022-10-14 | End: 2022-10-14

## 2022-10-14 RX ORDER — DEXTROSE 50 % IN WATER 50 %
12.5 SYRINGE (ML) INTRAVENOUS ONCE
Refills: 0 | Status: DISCONTINUED | OUTPATIENT
Start: 2022-10-14 | End: 2022-10-14

## 2022-10-14 RX ORDER — SODIUM CHLORIDE 9 MG/ML
1000 INJECTION, SOLUTION INTRAVENOUS
Refills: 0 | Status: DISCONTINUED | OUTPATIENT
Start: 2022-10-14 | End: 2022-10-14

## 2022-10-14 RX ORDER — DEXTROSE 50 % IN WATER 50 %
25 SYRINGE (ML) INTRAVENOUS ONCE
Refills: 0 | Status: DISCONTINUED | OUTPATIENT
Start: 2022-10-14 | End: 2022-10-14

## 2022-10-14 RX ORDER — INSULIN LISPRO 100/ML
VIAL (ML) SUBCUTANEOUS
Refills: 0 | Status: DISCONTINUED | OUTPATIENT
Start: 2022-10-14 | End: 2022-10-14

## 2022-10-14 RX ADMIN — PIPERACILLIN AND TAZOBACTAM 25 GRAM(S): 4; .5 INJECTION, POWDER, LYOPHILIZED, FOR SOLUTION INTRAVENOUS at 23:02

## 2022-10-14 RX ADMIN — PIPERACILLIN AND TAZOBACTAM 25 GRAM(S): 4; .5 INJECTION, POWDER, LYOPHILIZED, FOR SOLUTION INTRAVENOUS at 06:17

## 2022-10-14 RX ADMIN — Medication 100 MILLIGRAM(S): at 14:24

## 2022-10-14 RX ADMIN — BUDESONIDE AND FORMOTEROL FUMARATE DIHYDRATE 2 PUFF(S): 160; 4.5 AEROSOL RESPIRATORY (INHALATION) at 06:29

## 2022-10-14 RX ADMIN — Medication 100 MILLIGRAM(S): at 23:01

## 2022-10-14 RX ADMIN — TIOTROPIUM BROMIDE 1 CAPSULE(S): 18 CAPSULE ORAL; RESPIRATORY (INHALATION) at 08:35

## 2022-10-14 RX ADMIN — PIPERACILLIN AND TAZOBACTAM 25 GRAM(S): 4; .5 INJECTION, POWDER, LYOPHILIZED, FOR SOLUTION INTRAVENOUS at 14:09

## 2022-10-14 RX ADMIN — SIMVASTATIN 20 MILLIGRAM(S): 20 TABLET, FILM COATED ORAL at 23:01

## 2022-10-14 RX ADMIN — PANTOPRAZOLE SODIUM 40 MILLIGRAM(S): 20 TABLET, DELAYED RELEASE ORAL at 12:05

## 2022-10-14 RX ADMIN — BUDESONIDE AND FORMOTEROL FUMARATE DIHYDRATE 2 PUFF(S): 160; 4.5 AEROSOL RESPIRATORY (INHALATION) at 18:05

## 2022-10-14 RX ADMIN — ENOXAPARIN SODIUM 40 MILLIGRAM(S): 100 INJECTION SUBCUTANEOUS at 06:28

## 2022-10-14 RX ADMIN — Medication 100 MILLIGRAM(S): at 06:29

## 2022-10-14 RX ADMIN — LOSARTAN POTASSIUM 50 MILLIGRAM(S): 100 TABLET, FILM COATED ORAL at 06:17

## 2022-10-14 RX ADMIN — MONTELUKAST 10 MILLIGRAM(S): 4 TABLET, CHEWABLE ORAL at 12:06

## 2022-10-14 NOTE — PROGRESS NOTE ADULT - SUBJECTIVE AND OBJECTIVE BOX
SURGERY PA NOTE    48 y/o WM with PMHx of HTN, DM, VIOLETA, Gout, Asthma admitted with CT findings of acute sigmoid diverticulitis.    SUBJECTIVE:  Patient seen at beside, no overnight events, no complaints at this time.  Reports pain is well-controlled and improved.  Patient admits flatus, no bowel movement, voiding independently, ambulating.  Patient denies chest pain, shortness of breath, headache, dizziness, fever/chills, dysuria, nausea, vomiting, diarrhea.    OBJECTIVE:   T(F): 97.8 (10-14-22 @ 07:56), Max: 98 (10-13-22 @ 11:20)  HR: 101 (10-14-22 @ 07:56) (95 - 110)  BP: 111/74 (10-14-22 @ 07:56) (97/62 - 115/80)  RR: 18 (10-14-22 @ 07:56) (18 - 20)  SpO2: 94% (10-14-22 @ 07:56) (93% - 96%)    PHYSICAL EXAM:  General: A+O x 3, NAD  HEENT: PERRLA, EOMs intact, non-icteric  Chest: Clear to auscultation bilaterally, no rales/rhonchi/wheezes noted  Heart: S1, S2 clear, RRR  Abdomen: soft, non-distended, mild LLQ pain with guarding, no rebound, +BS x 4, no CVA noted  Extremities: nonedematous bilaterally, warm, no calf tenderness noted     MEDICATIONS  (STANDING):  budesonide 160 MICROgram(s)/formoterol 4.5 MICROgram(s) Inhaler 2 Puff(s) Inhalation two times a day  dextrose 5%. 1000 milliLiter(s) (50 mL/Hr) IV Continuous <Continuous>  dextrose 5%. 1000 milliLiter(s) (100 mL/Hr) IV Continuous <Continuous>  dextrose 50% Injectable 25 Gram(s) IV Push once  dextrose 50% Injectable 12.5 Gram(s) IV Push once  dextrose 50% Injectable 25 Gram(s) IV Push once  docusate sodium 100 milliGRAM(s) Oral three times a day  enoxaparin Injectable 40 milliGRAM(s) SubCutaneous every 24 hours  glucagon  Injectable 1 milliGRAM(s) IntraMuscular once  insulin lispro (ADMELOG) corrective regimen sliding scale   SubCutaneous three times a day before meals  losartan 50 milliGRAM(s) Oral daily  montelukast 10 milliGRAM(s) Oral daily  pantoprazole  Injectable 40 milliGRAM(s) IV Push daily  piperacillin/tazobactam IVPB.. 3.375 Gram(s) IV Intermittent every 8 hours  simvastatin 20 milliGRAM(s) Oral at bedtime  sodium chloride 0.9%. 1000 milliLiter(s) (125 mL/Hr) IV Continuous <Continuous>  tiotropium 18 MICROgram(s) Capsule 1 Capsule(s) Inhalation daily    MEDICATIONS  (PRN):  acetaminophen     Tablet .. 650 milliGRAM(s) Oral every 6 hours PRN Temp greater or equal to 38C (100.4F), Mild Pain (1 - 3)  dextrose Oral Gel 15 Gram(s) Oral once PRN Blood Glucose LESS THAN 70 milliGRAM(s)/deciliter  HYDROmorphone  Injectable 0.5 milliGRAM(s) IV Push every 3 hours PRN Severe Pain (7 - 10)  ketorolac   Injectable 30 milliGRAM(s) IV Push every 6 hours PRN Moderate Pain (4 - 6)  melatonin 5 milliGRAM(s) Oral at bedtime PRN Insomnia      LABS:                        13.1   10.33 )-----------( 218      ( 14 Oct 2022 06:00 )             39.9     10-14    137  |  102  |  12  ----------------------------<  87  3.9   |  24  |  0.77    Ca    8.2<L>      14 Oct 2022 06:00    TPro  6.8  /  Alb  3.3  /  TBili  0.8  /  DBili  x   /  AST  12  /  ALT  23  /  AlkPhos  52  10-14    PT/INR - ( 14 Oct 2022 06:00 )   PT: 14.6 sec;   INR: 1.27 ratio         PTT - ( 14 Oct 2022 06:00 )  PTT:33.2 sec  Urinalysis Basic - ( 13 Oct 2022 11:30 )    Color: Yellow / Appearance: Clear / S.025 / pH: x  Gluc: x / Ketone: Trace  / Bili: Negative / Urobili: Negative mg/dL   Blood: x / Protein: 15 mg/dL / Nitrite: Negative   Leuk Esterase: Trace / RBC: 0-2 /HPF / WBC 0-2   Sq Epi: x / Non Sq Epi: Moderate / Bacteria: Few        RADIOLOGY & ADDITIONAL STUDIES:    ACC: [id number] EXAM:  CT ABDOMEN AND PELVIS IC                          PROCEDURE DATE:  10/13/2022          INTERPRETATION:  CLINICAL INFORMATION: Acute abdominal pain    COMPARISON: None.    CONTRAST/COMPLICATIONS:  IV Contrast: Omnipaque 350  90 cc administered   10 cc discarded  Oral Contrast: NONE  Complications: None reported at time of study completion    PROCEDURE:  CT of the Abdomen and Pelvis was performed.  Sagittal and coronal reformats were performed.    FINDINGS:  LOWER CHEST: Partially visualized right-sided bronchiectasis.    LIVER: Hepatomegaly. No focal hepatic masses.  BILE DUCTS: Normal caliber.  GALLBLADDER: Gallstones.  SPLEEN: Within normal limits.  PANCREAS: Within normal limits.  ADRENALS: Within normal limits.  KIDNEYS/URETERS: Kidneys enhance bilaterally and symmetrically without   hydronephrosis. Bilateral renal cysts one of which on the right   demonstrates peripheral calcification. Subcentimeter hypodensity in the   left lower pole too small to characterize. No intrarenal calculi. The   ureters are unremarkable.    BLADDER: Reactive thickening of the adjacent bladder wall.  REPRODUCTIVE ORGANS: Prostate within normal limits.    BOWEL: Acute sigmoid diverticulitis with adjacent pericolonic   infiltrative changes and pericolonic fluid. No discrete drainable fluid   collections. No bowel obstruction. Additional scattered colonic   diverticula. Normal appendix. Periampullary duodenal diverticula.  PERITONEUM: Trace to small pelvic ascites. No free air. No drainable   fluid collections.  VESSELS: Within normal limits.  RETROPERITONEUM/LYMPH NODES: No lymphadenopathy.  ABDOMINAL WALL: Within normal limits.  BONES: Degenerative changes.    IMPRESSION:  Acute sigmoid diverticulitis.

## 2022-10-14 NOTE — PROGRESS NOTE ADULT - SUBJECTIVE AND OBJECTIVE BOX
Subjective: Patient seen and examined. No overnight events.     MEDICATIONS  (STANDING):  budesonide 160 MICROgram(s)/formoterol 4.5 MICROgram(s) Inhaler 2 Puff(s) Inhalation two times a day  docusate sodium 100 milliGRAM(s) Oral three times a day  enoxaparin Injectable 40 milliGRAM(s) SubCutaneous every 24 hours  losartan 50 milliGRAM(s) Oral daily  montelukast 10 milliGRAM(s) Oral daily  pantoprazole  Injectable 40 milliGRAM(s) IV Push daily  piperacillin/tazobactam IVPB.. 3.375 Gram(s) IV Intermittent every 8 hours  simvastatin 20 milliGRAM(s) Oral at bedtime  sodium chloride 0.9%. 1000 milliLiter(s) (125 mL/Hr) IV Continuous <Continuous>  tiotropium 18 MICROgram(s) Capsule 1 Capsule(s) Inhalation daily    MEDICATIONS  (PRN):  acetaminophen     Tablet .. 650 milliGRAM(s) Oral every 6 hours PRN Temp greater or equal to 38C (100.4F), Mild Pain (1 - 3)  HYDROmorphone  Injectable 0.5 milliGRAM(s) IV Push every 3 hours PRN Severe Pain (7 - 10)  ketorolac   Injectable 30 milliGRAM(s) IV Push every 6 hours PRN Moderate Pain (4 - 6)  melatonin 5 milliGRAM(s) Oral at bedtime PRN Insomnia      Allergies    No Known Allergies    Intolerances        Vital Signs Last 24 Hrs  T(C): 36.6 (14 Oct 2022 07:56), Max: 36.7 (13 Oct 2022 13:34)  T(F): 97.8 (14 Oct 2022 07:56), Max: 98 (13 Oct 2022 13:34)  HR: 101 (14 Oct 2022 07:56) (95 - 110)  BP: 111/74 (14 Oct 2022 07:56) (97/62 - 115/80)  BP(mean): 91 (13 Oct 2022 13:34) (91 - 91)  RR: 18 (14 Oct 2022 07:56) (18 - 20)  SpO2: 94% (14 Oct 2022 07:56) (93% - 96%)    Parameters below as of 14 Oct 2022 07:56  Patient On (Oxygen Delivery Method): room air        PHYSICAL EXAM:  GENERAL: NAD, well-groomed, well-developed  HEAD:  Atraumatic, Normocephalic  ENMT: Moist mucous membranes,   NECK: Supple, No JVD, Normal thyroid  NERVOUS SYSTEM:  All 4 extremities mobile, no gross sensory deficits.   CHEST/LUNG: Clear to auscultation bilaterally; No rales, rhonchi, wheezing, or rubs  HEART: Regular rate and rhythm; No murmurs, rubs, or gallops  ABDOMEN: Soft, Nontender, Nondistended; Bowel sounds present  EXTREMITIES:  2+ Peripheral Pulses, No clubbing, cyanosis, or edema      LABS:                        13.1   10.33 )-----------( 218      ( 14 Oct 2022 06:00 )             39.9     14 Oct 2022 06:00    137    |  102    |  12     ----------------------------<  87     3.9     |  24     |  0.77     Ca    8.2        14 Oct 2022 06:00    TPro  6.8    /  Alb  3.3    /  TBili  0.8    /  DBili  x      /  AST  12     /  ALT  23     /  AlkPhos  52     14 Oct 2022 06:00    PT/INR - ( 14 Oct 2022 06:00 )   PT: 14.6 sec;   INR: 1.27 ratio         PTT - ( 14 Oct 2022 06:00 )  PTT:33.2 sec  Urinalysis Basic - ( 13 Oct 2022 11:30 )    Color: Yellow / Appearance: Clear / S.025 / pH: x  Gluc: x / Ketone: Trace  / Bili: Negative / Urobili: Negative mg/dL   Blood: x / Protein: 15 mg/dL / Nitrite: Negative   Leuk Esterase: Trace / RBC: 0-2 /HPF / WBC 0-2   Sq Epi: x / Non Sq Epi: Moderate / Bacteria: Few      CAPILLARY BLOOD GLUCOSE      POCT Blood Glucose.: 80 mg/dL (14 Oct 2022 11:58)      RADIOLOGY & ADDITIONAL TESTS:    Imaging Personally Reviewed:  [ ] YES     Consultant(s) Notes Reviewed:      Care Discussed with Consultants/Other Providers:    Advanced Directives: [ ] DNR  [ ] No feeding tube  [ ] MOLST in chart  [ ] MOLST completed today  [ ] Unknown

## 2022-10-14 NOTE — PROGRESS NOTE ADULT - ASSESSMENT
A/P: 48 y/o WM with acute sigmoid diverticulitis, no abscess, progressing well, labs reassuring,     Continue current conservative management  Diet - continue NPO with sips/chips  GI/DVT prophylaxis  Analgesia prn  OOB/ambulation on the floor  Incentive spirometry/Cough/Deep breathing exercises  Hospitalist Consultant f/u noted  AM labs  Serial ABD exams  Continue ABX - on Zosyn    Case & plan discussed with Dr. PANCHITO Scott and patient's nurse.   A/P: 46 y/o WM with acute sigmoid diverticulitis, no abscess, progressing well, labs reassuring,   Continue current conservative management  Diet - continue NPO with sips/chips  GI/DVT prophylaxis  Analgesia prn  OOB/ambulation on the floor  Incentive spirometry/Cough/Deep breathing exercises  Hospitalist Consultant f/u noted  AM labs  Serial ABD exams  Continue ABX - on Zosyn  Case & plan discussed with Dr. PANCHITO Scott and patient's nurse.    As in above PA notation.  Mr. Whyte personally seen and examined during late AM rounds.  Vitals non-suggestive overall without fever and slow improvement in tachycardia.  Abdomen full/ obese, but soft and non tense.  Improved tenderness, limited to LLQ without sree peritoneal findings.  Labs reassuring with normalization of WBCs and no acidosis or evidence of end organ failure.  Clinically, slowly improving overall.  Surgically, stable for present.  To continue current supportive care with ongoing IV ABX and plan for slow progression of diet- consideration of clear liquids tomorrow.

## 2022-10-14 NOTE — PROGRESS NOTE ADULT - ASSESSMENT
47M with HTN, DM2, Asthma, VIOLETA, and Gout admitted for Acute Sigmoid Diverticulitis     Acute Sigmoid Diverticulitis   NPO + IV Zosyn + Pain control   Imaging reviewed   Non operative management as of now   Management as per surgery     HTN / HLD  Continue Losartan + Statin    DM2  Hold Metformin  ISS and maintain BS < 180    Asthma / VIOLETA  Severe Asthma; Will continue home inhalers   Uses CPAP at bedtime 15/12    Gout  Hold meds    Diet  Regular    DVT Prophylaxis  Lovenox    Disposition   Discharge planning pending hospital course      47M with HTN, DM2, Asthma, VIOLETA, and Gout admitted for Acute Sigmoid Diverticulitis     Acute Sigmoid Diverticulitis   NPO + IV Zosyn + Pain control   Imaging reviewed   Non operative management as of now   Management as per surgery     HTN / HLD  Continue Losartan + Statin    DM2  Hold Metformin  Patients A1C is not elevated outpatient so will monitor BS on BMP  Will not do finger sticks until deemed necessary     Asthma / VIOLETA  Severe Asthma; Will continue home inhalers   Uses CPAP at bedtime 15/12    Gout  Hold meds    Diet  Regular    DVT Prophylaxis  Lovenox    Disposition   Discharge planning pending hospital course

## 2022-10-15 LAB
A1C WITH ESTIMATED AVERAGE GLUCOSE RESULT: 5.6 % — SIGNIFICANT CHANGE UP (ref 4–5.6)
ANION GAP SERPL CALC-SCNC: 10 MMOL/L — SIGNIFICANT CHANGE UP (ref 5–17)
BUN SERPL-MCNC: 11 MG/DL — SIGNIFICANT CHANGE UP (ref 7–23)
CALCIUM SERPL-MCNC: 8.9 MG/DL — SIGNIFICANT CHANGE UP (ref 8.4–10.5)
CHLORIDE SERPL-SCNC: 102 MMOL/L — SIGNIFICANT CHANGE UP (ref 96–108)
CO2 SERPL-SCNC: 26 MMOL/L — SIGNIFICANT CHANGE UP (ref 22–31)
CREAT SERPL-MCNC: 0.77 MG/DL — SIGNIFICANT CHANGE UP (ref 0.5–1.3)
EGFR: 113 ML/MIN/1.73M2 — SIGNIFICANT CHANGE UP
ESTIMATED AVERAGE GLUCOSE: 114 MG/DL — SIGNIFICANT CHANGE UP (ref 68–114)
GLUCOSE SERPL-MCNC: 80 MG/DL — SIGNIFICANT CHANGE UP (ref 70–99)
HCT VFR BLD CALC: 40.1 % — SIGNIFICANT CHANGE UP (ref 39–50)
HGB BLD-MCNC: 13.2 G/DL — SIGNIFICANT CHANGE UP (ref 13–17)
MCHC RBC-ENTMCNC: 28 PG — SIGNIFICANT CHANGE UP (ref 27–34)
MCHC RBC-ENTMCNC: 32.9 GM/DL — SIGNIFICANT CHANGE UP (ref 32–36)
MCV RBC AUTO: 85.1 FL — SIGNIFICANT CHANGE UP (ref 80–100)
NRBC # BLD: 0 /100 WBCS — SIGNIFICANT CHANGE UP (ref 0–0)
PLATELET # BLD AUTO: 226 K/UL — SIGNIFICANT CHANGE UP (ref 150–400)
POTASSIUM SERPL-MCNC: 4.2 MMOL/L — SIGNIFICANT CHANGE UP (ref 3.5–5.3)
POTASSIUM SERPL-SCNC: 4.2 MMOL/L — SIGNIFICANT CHANGE UP (ref 3.5–5.3)
RBC # BLD: 4.71 M/UL — SIGNIFICANT CHANGE UP (ref 4.2–5.8)
RBC # FLD: 12.6 % — SIGNIFICANT CHANGE UP (ref 10.3–14.5)
SODIUM SERPL-SCNC: 138 MMOL/L — SIGNIFICANT CHANGE UP (ref 135–145)
WBC # BLD: 8.15 K/UL — SIGNIFICANT CHANGE UP (ref 3.8–10.5)
WBC # FLD AUTO: 8.15 K/UL — SIGNIFICANT CHANGE UP (ref 3.8–10.5)

## 2022-10-15 PROCEDURE — 99232 SBSQ HOSP IP/OBS MODERATE 35: CPT

## 2022-10-15 PROCEDURE — 99233 SBSQ HOSP IP/OBS HIGH 50: CPT

## 2022-10-15 RX ADMIN — PIPERACILLIN AND TAZOBACTAM 25 GRAM(S): 4; .5 INJECTION, POWDER, LYOPHILIZED, FOR SOLUTION INTRAVENOUS at 13:41

## 2022-10-15 RX ADMIN — PIPERACILLIN AND TAZOBACTAM 25 GRAM(S): 4; .5 INJECTION, POWDER, LYOPHILIZED, FOR SOLUTION INTRAVENOUS at 06:00

## 2022-10-15 RX ADMIN — TIOTROPIUM BROMIDE 1 CAPSULE(S): 18 CAPSULE ORAL; RESPIRATORY (INHALATION) at 06:04

## 2022-10-15 RX ADMIN — MONTELUKAST 10 MILLIGRAM(S): 4 TABLET, CHEWABLE ORAL at 11:08

## 2022-10-15 RX ADMIN — LOSARTAN POTASSIUM 50 MILLIGRAM(S): 100 TABLET, FILM COATED ORAL at 06:00

## 2022-10-15 RX ADMIN — SODIUM CHLORIDE 125 MILLILITER(S): 9 INJECTION INTRAMUSCULAR; INTRAVENOUS; SUBCUTANEOUS at 05:59

## 2022-10-15 RX ADMIN — BUDESONIDE AND FORMOTEROL FUMARATE DIHYDRATE 2 PUFF(S): 160; 4.5 AEROSOL RESPIRATORY (INHALATION) at 06:04

## 2022-10-15 RX ADMIN — BUDESONIDE AND FORMOTEROL FUMARATE DIHYDRATE 2 PUFF(S): 160; 4.5 AEROSOL RESPIRATORY (INHALATION) at 18:53

## 2022-10-15 RX ADMIN — PIPERACILLIN AND TAZOBACTAM 25 GRAM(S): 4; .5 INJECTION, POWDER, LYOPHILIZED, FOR SOLUTION INTRAVENOUS at 21:24

## 2022-10-15 RX ADMIN — Medication 100 MILLIGRAM(S): at 21:24

## 2022-10-15 RX ADMIN — Medication 100 MILLIGRAM(S): at 06:00

## 2022-10-15 RX ADMIN — PANTOPRAZOLE SODIUM 40 MILLIGRAM(S): 20 TABLET, DELAYED RELEASE ORAL at 11:08

## 2022-10-15 RX ADMIN — SIMVASTATIN 20 MILLIGRAM(S): 20 TABLET, FILM COATED ORAL at 21:24

## 2022-10-15 RX ADMIN — ENOXAPARIN SODIUM 40 MILLIGRAM(S): 100 INJECTION SUBCUTANEOUS at 05:59

## 2022-10-15 RX ADMIN — Medication 100 MILLIGRAM(S): at 13:41

## 2022-10-15 NOTE — PROGRESS NOTE ADULT - ASSESSMENT
As in above notation.  Mr. Whyte personally seen and examined during late AM rounds.  Pain improving, though not yet resolved.  Vitals non-suggestive without fever and with resolution of tachycardia.  Abdomen soft with limited localized tenderness.  Labs reassuring with further improvement in WBCs.  Clinically, improving overall with acute diverticulitis.  Surgically, stable for present.  To continue current supportive care with initiation of clear liquids and tapering of IVF.

## 2022-10-15 NOTE — PROGRESS NOTE ADULT - SUBJECTIVE AND OBJECTIVE BOX
Patient is a 47y old  Male who presents with a chief complaint of Acute Sigmoid Diverticulitis (15 Oct 2022 12:01)        HPI:  47 year old male presents to [place] ER with compliant of left lower abdominal pain for approx 2 days. Admits to some diarrhea yesterday and decreased oral intake.  Patient states he took Gas-X and Peptobismol without relief.  Patient denies history of similar symptoms, denies N/V chest pain, shortness of breath, dizzyness or other complaints at this time.   No prior colonoscopy (13 Oct 2022 13:34)      SUBJECTIVE & OBJECTIVE: Pt seen and examined at bedside. nad    PHYSICAL EXAM:  T(C): 36.4 (10-15-22 @ 07:49), Max: 36.7 (10-14-22 @ 20:42)  HR: 89 (10-15-22 @ 07:49) (89 - 98)  BP: 121/76 (10-15-22 @ 07:49) (102/63 - 121/76)  RR: 18 (10-15-22 @ 07:49) (18 - 18)  SpO2: 96% (10-15-22 @ 07:49) (95% - 98%)  Wt(kg): --   GENERAL: NAD, well-groomed, well-developed  NECK: Supple, No JVD  NERVOUS SYSTEM:  Alert & Oriented X3,   CHEST/LUNG: Clear to auscultation bilaterally; No rales, rhonchi, wheezing, or rubs  HEART: Regular rate and rhythm; No murmurs, rubs, or gallops  ABDOMEN: Soft, Nontender, Nondistended; Bowel sounds present  EXTREMITIES:  2+ Peripheral Pulses, No clubbing, cyanosis, or edema        MEDICATIONS  (STANDING):  budesonide 160 MICROgram(s)/formoterol 4.5 MICROgram(s) Inhaler 2 Puff(s) Inhalation two times a day  docusate sodium 100 milliGRAM(s) Oral three times a day  enoxaparin Injectable 40 milliGRAM(s) SubCutaneous every 24 hours  losartan 50 milliGRAM(s) Oral daily  montelukast 10 milliGRAM(s) Oral daily  pantoprazole  Injectable 40 milliGRAM(s) IV Push daily  piperacillin/tazobactam IVPB.. 3.375 Gram(s) IV Intermittent every 8 hours  simvastatin 20 milliGRAM(s) Oral at bedtime  sodium chloride 0.9%. 1000 milliLiter(s) (75 mL/Hr) IV Continuous <Continuous>  tiotropium 18 MICROgram(s) Capsule 1 Capsule(s) Inhalation daily    MEDICATIONS  (PRN):  acetaminophen     Tablet .. 650 milliGRAM(s) Oral every 6 hours PRN Temp greater or equal to 38C (100.4F), Mild Pain (1 - 3)  HYDROmorphone  Injectable 0.5 milliGRAM(s) IV Push every 3 hours PRN Severe Pain (7 - 10)  ketorolac   Injectable 30 milliGRAM(s) IV Push every 6 hours PRN Moderate Pain (4 - 6)  melatonin 5 milliGRAM(s) Oral at bedtime PRN Insomnia      LABS:                        13.2   8.15  )-----------( 226      ( 15 Oct 2022 08:15 )             40.1     10-15    138  |  102  |  11  ----------------------------<  80  4.2   |  26  |  0.77    Ca    8.9      15 Oct 2022 08:15    TPro  6.8  /  Alb  3.3  /  TBili  0.8  /  DBili  x   /  AST  12  /  ALT  23  /  AlkPhos  52  10-14    PT/INR - ( 14 Oct 2022 06:00 )   PT: 14.6 sec;   INR: 1.27 ratio         PTT - ( 14 Oct 2022 06:00 )  PTT:33.2 sec      CAPILLARY BLOOD GLUCOSE          CAPILLARY BLOOD GLUCOSE        CAPILLARY BLOOD GLUCOSE      POCT Blood Glucose.: 80 mg/dL (14 Oct 2022 11:58)            RECENT CULTURES:      RADIOLOGY & ADDITIONAL TESTS:                        DVT/GI ppx  Discussed with pt @ bedside

## 2022-10-15 NOTE — PROGRESS NOTE ADULT - ASSESSMENT
47M with HTN, DM2, Asthma, VIOLETA, and Gout admitted for Acute Sigmoid Diverticulitis     Acute Sigmoid Diverticulitis    IV Zosyn + Pain control   Imaging reviewed   Non operative management as of now   Management as per surgery   will start on clear liquid     HTN / HLD  Continue Losartan + Statin    DM2  Hold Metformin  Patients A1C is not elevated outpatient so will monitor BS on BMP  Will not do finger sticks until deemed necessary     Asthma / VIOLETA  Severe Asthma; Will continue home inhalers   Uses CPAP at bedtime 15/12    Gout  Hold meds    Diet  Regular    DVT Prophylaxis  Lovenox    Disposition   Discharge planning pending hospital course

## 2022-10-15 NOTE — PROGRESS NOTE ADULT - SUBJECTIVE AND OBJECTIVE BOX
Quiet overnight per chart.  No acute events this am per RN.  Patient reports decreasing pain.  Passing flatus.  No BMs yet.  Eager to try clear liquids.      Vital Signs Last 24 Hrs  T(C): 36.4 (15 Oct 2022 07:49), Max: 36.7 (14 Oct 2022 20:42)  T(F): 97.6 (15 Oct 2022 07:49), Max: 98.1 (14 Oct 2022 21:02)  HR: 89 (15 Oct 2022 07:49) (89 - 98)  BP: 121/76 (15 Oct 2022 07:49) (102/63 - 121/76)  RR: 18 (15 Oct 2022 07:49) (18 - 18)  SpO2: 96% (15 Oct 2022 07:49) (95% - 98%)    Parameters below as of 15 Oct 2022 07:49  Patient On (Oxygen Delivery Method): room air      SUBJECTIVE: Pt seen resting comfortably in chair...      Pain: YES   Pain (0-10): "3... 4... with some movement and cramps, but  basically no pain really at rest..."                Nausea or Vomiting: NO           Flatus or Bowel Movement: Flatus  Void: YES       General Appearance: Appears well and in NAD  Neck: Supple  Chest: Equal expansion bilaterally  CV: Pulse regular presently  Abdomen: Full, obese, but soft, non tense, improved tenderness to deeper palpation of LLQ without sree peritoneal findings  Extremities: Grossly symmetric      MEDICATIONS  (STANDING):  budesonide 160 MICROgram(s)/formoterol 4.5 MICROgram(s) Inhaler 2 Puff(s) Inhalation two times a day  docusate sodium 100 milliGRAM(s) Oral three times a day  enoxaparin Injectable 40 milliGRAM(s) SubCutaneous every 24 hours  losartan 50 milliGRAM(s) Oral daily  montelukast 10 milliGRAM(s) Oral daily  pantoprazole  Injectable 40 milliGRAM(s) IV Push daily  piperacillin/tazobactam IVPB.. 3.375 Gram(s) IV Intermittent every 8 hours  simvastatin 20 milliGRAM(s) Oral at bedtime  sodium chloride 0.9%. 1000 milliLiter(s) (125 mL/Hr) IV Continuous <Continuous>  tiotropium 18 MICROgram(s) Capsule 1 Capsule(s) Inhalation daily      MEDICATIONS  (PRN):  acetaminophen     Tablet .. 650 milliGRAM(s) Oral every 6 hours PRN Temp greater or equal to 38C (100.4F), Mild Pain (1 - 3)  HYDROmorphone  Injectable 0.5 milliGRAM(s) IV Push every 3 hours PRN Severe Pain (7 - 10)  ketorolac   Injectable 30 milliGRAM(s) IV Push every 6 hours PRN Moderate Pain (4 - 6)  melatonin 5 milliGRAM(s) Oral at bedtime PRN Insomnia      LABS:                        13.2   8.15  )-----------( 226      ( 15 Oct 2022 08:15 )             40.1     10-15    138  |  102  |  11  ----------------------------<  80  4.2   |  26  |  0.77    Ca    8.9      15 Oct 2022 08:15    TPro  6.8  /  Alb  3.3  /  TBili  0.8  /  DBili  x   /  AST  12  /  ALT  23  /  AlkPhos  52  10-14    PT/INR - ( 14 Oct 2022 06:00 )   PT: 14.6 sec;   INR: 1.27 ratio    PTT - ( 14 Oct 2022 06:00 )  PTT:33.2 sec    RADIOLOGY & ADDITIONAL STUDIES:  No new imaging.

## 2022-10-16 LAB
ALBUMIN SERPL ELPH-MCNC: 3.4 G/DL — SIGNIFICANT CHANGE UP (ref 3.3–5)
ALP SERPL-CCNC: 48 U/L — SIGNIFICANT CHANGE UP (ref 30–120)
ALT FLD-CCNC: 19 U/L DA — SIGNIFICANT CHANGE UP (ref 10–60)
ANION GAP SERPL CALC-SCNC: 9 MMOL/L — SIGNIFICANT CHANGE UP (ref 5–17)
AST SERPL-CCNC: 12 U/L — SIGNIFICANT CHANGE UP (ref 10–40)
BASOPHILS # BLD AUTO: 0.06 K/UL — SIGNIFICANT CHANGE UP (ref 0–0.2)
BASOPHILS NFR BLD AUTO: 0.9 % — SIGNIFICANT CHANGE UP (ref 0–2)
BILIRUB SERPL-MCNC: 0.5 MG/DL — SIGNIFICANT CHANGE UP (ref 0.2–1.2)
BUN SERPL-MCNC: 8 MG/DL — SIGNIFICANT CHANGE UP (ref 7–23)
CALCIUM SERPL-MCNC: 8.8 MG/DL — SIGNIFICANT CHANGE UP (ref 8.4–10.5)
CHLORIDE SERPL-SCNC: 103 MMOL/L — SIGNIFICANT CHANGE UP (ref 96–108)
CO2 SERPL-SCNC: 25 MMOL/L — SIGNIFICANT CHANGE UP (ref 22–31)
CREAT SERPL-MCNC: 0.72 MG/DL — SIGNIFICANT CHANGE UP (ref 0.5–1.3)
EGFR: 115 ML/MIN/1.73M2 — SIGNIFICANT CHANGE UP
EOSINOPHIL # BLD AUTO: 0.23 K/UL — SIGNIFICANT CHANGE UP (ref 0–0.5)
EOSINOPHIL NFR BLD AUTO: 3.3 % — SIGNIFICANT CHANGE UP (ref 0–6)
GLUCOSE SERPL-MCNC: 91 MG/DL — SIGNIFICANT CHANGE UP (ref 70–99)
HCT VFR BLD CALC: 39 % — SIGNIFICANT CHANGE UP (ref 39–50)
HGB BLD-MCNC: 13 G/DL — SIGNIFICANT CHANGE UP (ref 13–17)
IMM GRANULOCYTES NFR BLD AUTO: 0.6 % — SIGNIFICANT CHANGE UP (ref 0–0.9)
LYMPHOCYTES # BLD AUTO: 1.64 K/UL — SIGNIFICANT CHANGE UP (ref 1–3.3)
LYMPHOCYTES # BLD AUTO: 23.6 % — SIGNIFICANT CHANGE UP (ref 13–44)
MAGNESIUM SERPL-MCNC: 2 MG/DL — SIGNIFICANT CHANGE UP (ref 1.6–2.6)
MCHC RBC-ENTMCNC: 28.3 PG — SIGNIFICANT CHANGE UP (ref 27–34)
MCHC RBC-ENTMCNC: 33.3 GM/DL — SIGNIFICANT CHANGE UP (ref 32–36)
MCV RBC AUTO: 84.8 FL — SIGNIFICANT CHANGE UP (ref 80–100)
MONOCYTES # BLD AUTO: 0.45 K/UL — SIGNIFICANT CHANGE UP (ref 0–0.9)
MONOCYTES NFR BLD AUTO: 6.5 % — SIGNIFICANT CHANGE UP (ref 2–14)
NEUTROPHILS # BLD AUTO: 4.52 K/UL — SIGNIFICANT CHANGE UP (ref 1.8–7.4)
NEUTROPHILS NFR BLD AUTO: 65.1 % — SIGNIFICANT CHANGE UP (ref 43–77)
NRBC # BLD: 0 /100 WBCS — SIGNIFICANT CHANGE UP (ref 0–0)
PLATELET # BLD AUTO: 237 K/UL — SIGNIFICANT CHANGE UP (ref 150–400)
POTASSIUM SERPL-MCNC: 4.1 MMOL/L — SIGNIFICANT CHANGE UP (ref 3.5–5.3)
POTASSIUM SERPL-SCNC: 4.1 MMOL/L — SIGNIFICANT CHANGE UP (ref 3.5–5.3)
PROT SERPL-MCNC: 7.2 G/DL — SIGNIFICANT CHANGE UP (ref 6–8.3)
RBC # BLD: 4.6 M/UL — SIGNIFICANT CHANGE UP (ref 4.2–5.8)
RBC # FLD: 12.7 % — SIGNIFICANT CHANGE UP (ref 10.3–14.5)
SODIUM SERPL-SCNC: 137 MMOL/L — SIGNIFICANT CHANGE UP (ref 135–145)
WBC # BLD: 6.94 K/UL — SIGNIFICANT CHANGE UP (ref 3.8–10.5)
WBC # FLD AUTO: 6.94 K/UL — SIGNIFICANT CHANGE UP (ref 3.8–10.5)

## 2022-10-16 PROCEDURE — 99232 SBSQ HOSP IP/OBS MODERATE 35: CPT

## 2022-10-16 PROCEDURE — 99233 SBSQ HOSP IP/OBS HIGH 50: CPT

## 2022-10-16 RX ADMIN — PIPERACILLIN AND TAZOBACTAM 25 GRAM(S): 4; .5 INJECTION, POWDER, LYOPHILIZED, FOR SOLUTION INTRAVENOUS at 13:26

## 2022-10-16 RX ADMIN — BUDESONIDE AND FORMOTEROL FUMARATE DIHYDRATE 2 PUFF(S): 160; 4.5 AEROSOL RESPIRATORY (INHALATION) at 06:31

## 2022-10-16 RX ADMIN — BUDESONIDE AND FORMOTEROL FUMARATE DIHYDRATE 2 PUFF(S): 160; 4.5 AEROSOL RESPIRATORY (INHALATION) at 18:34

## 2022-10-16 RX ADMIN — LOSARTAN POTASSIUM 50 MILLIGRAM(S): 100 TABLET, FILM COATED ORAL at 06:26

## 2022-10-16 RX ADMIN — PANTOPRAZOLE SODIUM 40 MILLIGRAM(S): 20 TABLET, DELAYED RELEASE ORAL at 13:26

## 2022-10-16 RX ADMIN — TIOTROPIUM BROMIDE 1 CAPSULE(S): 18 CAPSULE ORAL; RESPIRATORY (INHALATION) at 06:31

## 2022-10-16 RX ADMIN — Medication 100 MILLIGRAM(S): at 06:26

## 2022-10-16 RX ADMIN — MONTELUKAST 10 MILLIGRAM(S): 4 TABLET, CHEWABLE ORAL at 16:33

## 2022-10-16 RX ADMIN — SODIUM CHLORIDE 75 MILLILITER(S): 9 INJECTION INTRAMUSCULAR; INTRAVENOUS; SUBCUTANEOUS at 13:26

## 2022-10-16 RX ADMIN — SODIUM CHLORIDE 50 MILLILITER(S): 9 INJECTION INTRAMUSCULAR; INTRAVENOUS; SUBCUTANEOUS at 21:58

## 2022-10-16 RX ADMIN — SIMVASTATIN 20 MILLIGRAM(S): 20 TABLET, FILM COATED ORAL at 21:59

## 2022-10-16 RX ADMIN — ENOXAPARIN SODIUM 40 MILLIGRAM(S): 100 INJECTION SUBCUTANEOUS at 06:28

## 2022-10-16 RX ADMIN — PIPERACILLIN AND TAZOBACTAM 25 GRAM(S): 4; .5 INJECTION, POWDER, LYOPHILIZED, FOR SOLUTION INTRAVENOUS at 06:26

## 2022-10-16 RX ADMIN — PIPERACILLIN AND TAZOBACTAM 25 GRAM(S): 4; .5 INJECTION, POWDER, LYOPHILIZED, FOR SOLUTION INTRAVENOUS at 21:59

## 2022-10-16 NOTE — DIETITIAN INITIAL EVALUATION ADULT - ORAL INTAKE PTA/DIET HISTORY
Pt seen at bedside, reports good appetite and PO intake PTA, consuming 3 meals/day and generally consuming balanced meals to promote wt loss. Pt states decreased PO intake x1 day prior to admission. Confirms NKFA and takes Multivitamin and vitamin D supplementation at home.

## 2022-10-16 NOTE — PROGRESS NOTE ADULT - SUBJECTIVE AND OBJECTIVE BOX
Quiet overnight per chart.  No acute events today per RN.  Patient reports marked reduction in pain.  Tolerating full liquids well.  Starting to pass formed BM's.      Vital Signs Last 24 Hrs  T(C): 36.8 (16 Oct 2022 15:24), Max: 36.9 (15 Oct 2022 20:47)  T(F): 98.2 (16 Oct 2022 15:24), Max: 98.5 (15 Oct 2022 20:47)  HR: 85 (16 Oct 2022 15:24) (85 - 98)  BP: 107/65 (16 Oct 2022 15:24) (107/65 - 124/85)  RR: 15 (16 Oct 2022 15:24) (15 - 18)  SpO2: 97% (16 Oct 2022 15:24) (94% - 97%)      Parameters below as of 16 Oct 2022 15:24  Patient On (Oxygen Delivery Method): room air      SUBJECTIVE: Pt seen resting comfortably in bed...      Pain: YES    Pain (0-10): "1... 2..."                Nausea or Vomiting: NO           Flatus or Bowel Movement:  YES               Void: YES       General Appearance: Appears well and in NAD  Neck: Supple  Chest: Equal expansion bilaterally  CV: Pulse regular presently  Abdomen: Soft, non tense, minimal tenderness to deepest palpation of LLQ without guarding/ rebound/ referred pain.  Extremities: Grossly symmetric       MEDICATIONS  (STANDING):  budesonide 160 MICROgram(s)/formoterol 4.5 MICROgram(s) Inhaler 2 Puff(s) Inhalation two times a day  docusate sodium 100 milliGRAM(s) Oral three times a day  enoxaparin Injectable 40 milliGRAM(s) SubCutaneous every 24 hours  losartan 50 milliGRAM(s) Oral daily  montelukast 10 milliGRAM(s) Oral daily  pantoprazole  Injectable 40 milliGRAM(s) IV Push daily  piperacillin/tazobactam IVPB.. 3.375 Gram(s) IV Intermittent every 8 hours  simvastatin 20 milliGRAM(s) Oral at bedtime  sodium chloride 0.9%. 1000 milliLiter(s) (50 mL/Hr) IV Continuous <Continuous>  tiotropium 18 MICROgram(s) Capsule 1 Capsule(s) Inhalation daily      MEDICATIONS  (PRN):  acetaminophen     Tablet .. 650 milliGRAM(s) Oral every 6 hours PRN Temp greater or equal to 38C (100.4F), Mild Pain (1 - 3)  HYDROmorphone  Injectable 0.5 milliGRAM(s) IV Push every 3 hours PRN Severe Pain (7 - 10)  ketorolac   Injectable 30 milliGRAM(s) IV Push every 6 hours PRN Moderate Pain (4 - 6)  melatonin 5 milliGRAM(s) Oral at bedtime PRN Insomnia      LABS:                        13.0   6.94  )-----------( 237      ( 16 Oct 2022 06:00 )             39.0     10-16    137  |  103  |  8   ----------------------------<  91  4.1   |  25  |  0.72    Ca    8.8      16 Oct 2022 06:00  Mg     2.0     10-16    TPro  7.2  /  Alb  3.4  /  TBili  0.5  /  DBili  x   /  AST  12  /  ALT  19  /  AlkPhos  48  10-16            RADIOLOGY & ADDITIONAL STUDIES:

## 2022-10-16 NOTE — DIETITIAN INITIAL EVALUATION ADULT - PERTINENT MEDS FT
MEDICATIONS  (STANDING):  budesonide 160 MICROgram(s)/formoterol 4.5 MICROgram(s) Inhaler 2 Puff(s) Inhalation two times a day  docusate sodium 100 milliGRAM(s) Oral three times a day  enoxaparin Injectable 40 milliGRAM(s) SubCutaneous every 24 hours  losartan 50 milliGRAM(s) Oral daily  montelukast 10 milliGRAM(s) Oral daily  pantoprazole  Injectable 40 milliGRAM(s) IV Push daily  piperacillin/tazobactam IVPB.. 3.375 Gram(s) IV Intermittent every 8 hours  simvastatin 20 milliGRAM(s) Oral at bedtime  sodium chloride 0.9%. 1000 milliLiter(s) (75 mL/Hr) IV Continuous <Continuous>  tiotropium 18 MICROgram(s) Capsule 1 Capsule(s) Inhalation daily    MEDICATIONS  (PRN):  acetaminophen     Tablet .. 650 milliGRAM(s) Oral every 6 hours PRN Temp greater or equal to 38C (100.4F), Mild Pain (1 - 3)  HYDROmorphone  Injectable 0.5 milliGRAM(s) IV Push every 3 hours PRN Severe Pain (7 - 10)  ketorolac   Injectable 30 milliGRAM(s) IV Push every 6 hours PRN Moderate Pain (4 - 6)  melatonin 5 milliGRAM(s) Oral at bedtime PRN Insomnia

## 2022-10-16 NOTE — PROGRESS NOTE ADULT - ASSESSMENT
As in above note.  Mr. Whyte personally seen and examined during early evening rounds.  Pain still improving further, though not yet fully resolved.  Vitals remain non-suggestive without fever and with resolution of tachycardia.  Abdomen soft with minimal/ limited localizing tenderness.  Labs reassuring with further improvement in his WBCs.  Clinically, improving overall with conservative management of acute diverticulitis.  Surgically, stable for present with plan for possible low residue diet tomorrow.  He is aware that GI follow-up outpatient and interval colonoscopy will be required.

## 2022-10-16 NOTE — DIETITIAN INITIAL EVALUATION ADULT - REASON FOR ADMISSION
As per H&P "The pt is a 47 year old male presents to [place] ER with compliant of left lower abdominal pain for approx 2 days. Admits to some diarrhea yesterday and decreased oral intake.  Patient states he took Gas-X and Peptobismol without relief.  Patient denies history of similar symptoms, denies N/V chest pain, shortness of breath, dizzyness or other complaints at this time. No prior colonoscopy"

## 2022-10-16 NOTE — PROGRESS NOTE ADULT - SUBJECTIVE AND OBJECTIVE BOX
Patient is a 47y old  Male who presents with a chief complaint of Acute Sigmoid Diverticulitis (15 Oct 2022 12:26)        HPI:  47 year old male presents to [place] ER with compliant of left lower abdominal pain for approx 2 days. Admits to some diarrhea yesterday and decreased oral intake.  Patient states he took Gas-X and Peptobismol without relief.  Patient denies history of similar symptoms, denies N/V chest pain, shortness of breath, dizzyness or other complaints at this time.   No prior colonoscopy (13 Oct 2022 13:34)      SUBJECTIVE & OBJECTIVE: Pt seen and examined at bedside. nad    PHYSICAL EXAM:  T(C): 36.7 (10-16-22 @ 07:35), Max: 36.9 (10-15-22 @ 20:47)  HR: 92 (10-16-22 @ 07:35) (85 - 98)  BP: 124/78 (10-16-22 @ 07:35) (110/67 - 124/85)  RR: 16 (10-16-22 @ 07:35) (16 - 18)  SpO2: 94% (10-16-22 @ 07:35) (94% - 97%)  Wt(kg): --   GENERAL: NAD, well-groomed, well-developed  HEAD:  Atraumatic, Normocephalic  EYES: EOMI, PERRLA, conjunctiva and sclera clear  ENMT: Moist mucous membranes  NECK: Supple, No JVD  NERVOUS SYSTEM:  Alert & Oriented X3, Motor Strength 5/5 B/L upper and lower extremities; DTRs 2+ intact and symmetric  CHEST/LUNG: Clear to auscultation bilaterally; No rales, rhonchi, wheezing, or rubs  HEART: Regular rate and rhythm; No murmurs, rubs, or gallops  ABDOMEN: Soft, Nontender, Nondistended; Bowel sounds present  EXTREMITIES:  2+ Peripheral Pulses, No clubbing, cyanosis, or edema        MEDICATIONS  (STANDING):  budesonide 160 MICROgram(s)/formoterol 4.5 MICROgram(s) Inhaler 2 Puff(s) Inhalation two times a day  docusate sodium 100 milliGRAM(s) Oral three times a day  enoxaparin Injectable 40 milliGRAM(s) SubCutaneous every 24 hours  losartan 50 milliGRAM(s) Oral daily  montelukast 10 milliGRAM(s) Oral daily  pantoprazole  Injectable 40 milliGRAM(s) IV Push daily  piperacillin/tazobactam IVPB.. 3.375 Gram(s) IV Intermittent every 8 hours  simvastatin 20 milliGRAM(s) Oral at bedtime  sodium chloride 0.9%. 1000 milliLiter(s) (75 mL/Hr) IV Continuous <Continuous>  tiotropium 18 MICROgram(s) Capsule 1 Capsule(s) Inhalation daily    MEDICATIONS  (PRN):  acetaminophen     Tablet .. 650 milliGRAM(s) Oral every 6 hours PRN Temp greater or equal to 38C (100.4F), Mild Pain (1 - 3)  HYDROmorphone  Injectable 0.5 milliGRAM(s) IV Push every 3 hours PRN Severe Pain (7 - 10)  ketorolac   Injectable 30 milliGRAM(s) IV Push every 6 hours PRN Moderate Pain (4 - 6)  melatonin 5 milliGRAM(s) Oral at bedtime PRN Insomnia      LABS:                        13.0   6.94  )-----------( 237      ( 16 Oct 2022 06:00 )             39.0     10-16    137  |  103  |  8   ----------------------------<  91  4.1   |  25  |  0.72    Ca    8.8      16 Oct 2022 06:00  Mg     2.0     10-16    TPro  7.2  /  Alb  3.4  /  TBili  0.5  /  DBili  x   /  AST  12  /  ALT  19  /  AlkPhos  48  10-16        Magnesium, Serum: 2.0 mg/dL (10-16 @ 06:00)    CAPILLARY BLOOD GLUCOSE          CAPILLARY BLOOD GLUCOSE        CAPILLARY BLOOD GLUCOSE      POCT Blood Glucose.: 80 mg/dL (14 Oct 2022 11:58)            RECENT CULTURES:      RADIOLOGY & ADDITIONAL TESTS:                        DVT/GI ppx  Discussed with pt @ bedside

## 2022-10-16 NOTE — DIETITIAN INITIAL EVALUATION ADULT - ADD RECOMMEND
1) When medically feasible recommend advancing diet to low-fiber with consistent carbohydrate diet modifier 2) Monitor need for diet reinforcement, nutrition education provided 3) RD to remain available

## 2022-10-16 NOTE — DIETITIAN INITIAL EVALUATION ADULT - OTHER INFO
Pt reports a UBW wt of 246-248 lbs with noted gradual wt loss in the past 3 months secondary to lifestyle changes and taking wegovy. Upon admission pt with a wt of 241.4 lbs (10/13), will continue to monitor weights and trend as available.     Pt admitted for Acute Sigmoid Diverticulitis, upon admission NPO from (10/13-10/15) and clear liquids from (10/15-10/16) advanced to full liquid diet today, reports good tolerance and intake with no noted acute GI distress. Pt noted with previous hx of type 2 DM with HbA1c 6.1% (10/12) reports taking metformin and wegovy at home and follows up with endocrinologist every 3 months and does not check finger sticks at home.      When medically feasible, recommend advancing diet to Low-fiber with Consistent carbohydrate diet modifier. Pt receptive to education, provided low-fiber nutrition therapy including importance consuming small frequent meals, chewing foods well and adequate hydration and protein intake.  Discussed avoiding  fiber rich foods, fresh fruits/vegetables, whole grains, added fiber in processed foods and gradual reintroduction of fiber back into diet once cleared by MD. Pt verbalized understanding and accepted written materials. Patient with no nutrition-related questions at this time. Made aware RD remains available as needed.

## 2022-10-16 NOTE — DIETITIAN INITIAL EVALUATION ADULT - NSICDXPASTMEDICALHX_GEN_ALL_CORE_FT
Pt called stating she would like to schedule her procedure   Pt can be reached at [phone]
There is already an ongoing task regarding patient needing an appt that documents calls to patient/office  Will close; do not add to this task 
PAST MEDICAL HISTORY:  Asthma     Diabetes mellitus     Gout     Lymphoma, Hodgkin's     VIOLETA (obstructive sleep apnea)

## 2022-10-16 NOTE — DIETITIAN INITIAL EVALUATION ADULT - PERTINENT LABORATORY DATA
10-16    137  |  103  |  8   ----------------------------<  91  4.1   |  25  |  0.72    Ca    8.8      16 Oct 2022 06:00  Mg     2.0     10-16    TPro  7.2  /  Alb  3.4  /  TBili  0.5  /  DBili  x   /  AST  12  /  ALT  19  /  AlkPhos  48  10-16  A1C with Estimated Average Glucose Result: 5.6 % (10-15-22 @ 08:15)

## 2022-10-16 NOTE — PROGRESS NOTE ADULT - ASSESSMENT
47M with HTN, DM2, Asthma, VIOLETA, and Gout admitted for Acute Sigmoid Diverticulitis     Acute Sigmoid Diverticulitis    IV Zosyn + Pain control   Imaging reviewed   Non operative management as of now   Management as per surgery   tolerateind clearts, will advance diet to full liquid    HTN / HLD  Continue Losartan + Statin    DM2  Hold Metformin  Patients A1C is not elevated outpatient so will monitor BS on BMP  Will not do finger sticks until deemed necessary     Asthma / VIOLETA  Severe Asthma; Will continue home inhalers   Uses CPAP at bedtime 15/12    Gout  Hold meds    Diet  Regular    DVT Prophylaxis  Lovenox    Disposition   Discharge planning pending hospital course

## 2022-10-17 ENCOUNTER — TRANSCRIPTION ENCOUNTER (OUTPATIENT)
Age: 47
End: 2022-10-17

## 2022-10-17 LAB
ALBUMIN SERPL ELPH-MCNC: 3.5 G/DL — SIGNIFICANT CHANGE UP (ref 3.3–5)
ALP SERPL-CCNC: 52 U/L — SIGNIFICANT CHANGE UP (ref 30–120)
ALT FLD-CCNC: 21 U/L DA — SIGNIFICANT CHANGE UP (ref 10–60)
ANION GAP SERPL CALC-SCNC: 9 MMOL/L — SIGNIFICANT CHANGE UP (ref 5–17)
AST SERPL-CCNC: 17 U/L — SIGNIFICANT CHANGE UP (ref 10–40)
BILIRUB SERPL-MCNC: 0.3 MG/DL — SIGNIFICANT CHANGE UP (ref 0.2–1.2)
BUN SERPL-MCNC: 7 MG/DL — SIGNIFICANT CHANGE UP (ref 7–23)
CALCIUM SERPL-MCNC: 9.2 MG/DL — SIGNIFICANT CHANGE UP (ref 8.4–10.5)
CHLORIDE SERPL-SCNC: 103 MMOL/L — SIGNIFICANT CHANGE UP (ref 96–108)
CO2 SERPL-SCNC: 27 MMOL/L — SIGNIFICANT CHANGE UP (ref 22–31)
CREAT SERPL-MCNC: 0.79 MG/DL — SIGNIFICANT CHANGE UP (ref 0.5–1.3)
EGFR: 112 ML/MIN/1.73M2 — SIGNIFICANT CHANGE UP
GLUCOSE SERPL-MCNC: 102 MG/DL — HIGH (ref 70–99)
HCT VFR BLD CALC: 39.2 % — SIGNIFICANT CHANGE UP (ref 39–50)
HGB BLD-MCNC: 13.1 G/DL — SIGNIFICANT CHANGE UP (ref 13–17)
MCHC RBC-ENTMCNC: 28.2 PG — SIGNIFICANT CHANGE UP (ref 27–34)
MCHC RBC-ENTMCNC: 33.4 GM/DL — SIGNIFICANT CHANGE UP (ref 32–36)
MCV RBC AUTO: 84.3 FL — SIGNIFICANT CHANGE UP (ref 80–100)
NRBC # BLD: 0 /100 WBCS — SIGNIFICANT CHANGE UP (ref 0–0)
PLATELET # BLD AUTO: 266 K/UL — SIGNIFICANT CHANGE UP (ref 150–400)
POTASSIUM SERPL-MCNC: 3.5 MMOL/L — SIGNIFICANT CHANGE UP (ref 3.5–5.3)
POTASSIUM SERPL-SCNC: 3.5 MMOL/L — SIGNIFICANT CHANGE UP (ref 3.5–5.3)
PROT SERPL-MCNC: 7.2 G/DL — SIGNIFICANT CHANGE UP (ref 6–8.3)
RBC # BLD: 4.65 M/UL — SIGNIFICANT CHANGE UP (ref 4.2–5.8)
RBC # FLD: 12.7 % — SIGNIFICANT CHANGE UP (ref 10.3–14.5)
SODIUM SERPL-SCNC: 139 MMOL/L — SIGNIFICANT CHANGE UP (ref 135–145)
WBC # BLD: 6.24 K/UL — SIGNIFICANT CHANGE UP (ref 3.8–10.5)
WBC # FLD AUTO: 6.24 K/UL — SIGNIFICANT CHANGE UP (ref 3.8–10.5)

## 2022-10-17 PROCEDURE — 99232 SBSQ HOSP IP/OBS MODERATE 35: CPT

## 2022-10-17 PROCEDURE — 99233 SBSQ HOSP IP/OBS HIGH 50: CPT

## 2022-10-17 RX ORDER — CEFPODOXIME PROXETIL 100 MG
1 TABLET ORAL
Qty: 10 | Refills: 0
Start: 2022-10-17 | End: 2022-10-21

## 2022-10-17 RX ORDER — METRONIDAZOLE 500 MG
1 TABLET ORAL
Qty: 15 | Refills: 0
Start: 2022-10-17 | End: 2022-10-21

## 2022-10-17 RX ADMIN — MONTELUKAST 10 MILLIGRAM(S): 4 TABLET, CHEWABLE ORAL at 13:11

## 2022-10-17 RX ADMIN — SODIUM CHLORIDE 50 MILLILITER(S): 9 INJECTION INTRAMUSCULAR; INTRAVENOUS; SUBCUTANEOUS at 19:30

## 2022-10-17 RX ADMIN — Medication 100 MILLIGRAM(S): at 05:26

## 2022-10-17 RX ADMIN — PIPERACILLIN AND TAZOBACTAM 25 GRAM(S): 4; .5 INJECTION, POWDER, LYOPHILIZED, FOR SOLUTION INTRAVENOUS at 21:53

## 2022-10-17 RX ADMIN — SIMVASTATIN 20 MILLIGRAM(S): 20 TABLET, FILM COATED ORAL at 21:53

## 2022-10-17 RX ADMIN — ENOXAPARIN SODIUM 40 MILLIGRAM(S): 100 INJECTION SUBCUTANEOUS at 06:15

## 2022-10-17 RX ADMIN — LOSARTAN POTASSIUM 50 MILLIGRAM(S): 100 TABLET, FILM COATED ORAL at 05:26

## 2022-10-17 RX ADMIN — PIPERACILLIN AND TAZOBACTAM 25 GRAM(S): 4; .5 INJECTION, POWDER, LYOPHILIZED, FOR SOLUTION INTRAVENOUS at 13:11

## 2022-10-17 RX ADMIN — Medication 100 MILLIGRAM(S): at 21:53

## 2022-10-17 RX ADMIN — TIOTROPIUM BROMIDE 1 CAPSULE(S): 18 CAPSULE ORAL; RESPIRATORY (INHALATION) at 10:05

## 2022-10-17 RX ADMIN — BUDESONIDE AND FORMOTEROL FUMARATE DIHYDRATE 2 PUFF(S): 160; 4.5 AEROSOL RESPIRATORY (INHALATION) at 10:04

## 2022-10-17 RX ADMIN — PIPERACILLIN AND TAZOBACTAM 25 GRAM(S): 4; .5 INJECTION, POWDER, LYOPHILIZED, FOR SOLUTION INTRAVENOUS at 05:26

## 2022-10-17 RX ADMIN — BUDESONIDE AND FORMOTEROL FUMARATE DIHYDRATE 2 PUFF(S): 160; 4.5 AEROSOL RESPIRATORY (INHALATION) at 19:32

## 2022-10-17 RX ADMIN — Medication 100 MILLIGRAM(S): at 13:12

## 2022-10-17 RX ADMIN — PANTOPRAZOLE SODIUM 40 MILLIGRAM(S): 20 TABLET, DELAYED RELEASE ORAL at 13:31

## 2022-10-17 NOTE — DISCHARGE NOTE PROVIDER - HOSPITAL COURSE
47 year old male admitted for acute sigmoid diverticulitis.  Patient was managed conservatively with IV antibiotics and bowel rest.  Patient progressed well clinically with resolution of pain and tolerated diet advancement to low fiber diet.  Patient's blood work was encouraging and patient was cleared for discharge with outpatient follow up.

## 2022-10-17 NOTE — PROGRESS NOTE ADULT - SUBJECTIVE AND OBJECTIVE BOX
Subjective: Patient seen and examined. No overnight events.  Doing well and pain improved. Passing flatus and BM's.     MEDICATIONS  (STANDING):  budesonide 160 MICROgram(s)/formoterol 4.5 MICROgram(s) Inhaler 2 Puff(s) Inhalation two times a day  docusate sodium 100 milliGRAM(s) Oral three times a day  enoxaparin Injectable 40 milliGRAM(s) SubCutaneous every 24 hours  losartan 50 milliGRAM(s) Oral daily  montelukast 10 milliGRAM(s) Oral daily  pantoprazole  Injectable 40 milliGRAM(s) IV Push daily  piperacillin/tazobactam IVPB.. 3.375 Gram(s) IV Intermittent every 8 hours  simvastatin 20 milliGRAM(s) Oral at bedtime  sodium chloride 0.9%. 1000 milliLiter(s) (50 mL/Hr) IV Continuous <Continuous>  tiotropium 18 MICROgram(s) Capsule 1 Capsule(s) Inhalation daily    MEDICATIONS  (PRN):  acetaminophen     Tablet .. 650 milliGRAM(s) Oral every 6 hours PRN Temp greater or equal to 38C (100.4F), Mild Pain (1 - 3)  HYDROmorphone  Injectable 0.5 milliGRAM(s) IV Push every 3 hours PRN Severe Pain (7 - 10)  ketorolac   Injectable 30 milliGRAM(s) IV Push every 6 hours PRN Moderate Pain (4 - 6)  melatonin 5 milliGRAM(s) Oral at bedtime PRN Insomnia      Allergies    No Known Allergies    Intolerances        Vital Signs Last 24 Hrs  T(C): 36.6 (17 Oct 2022 07:26), Max: 36.9 (16 Oct 2022 23:22)  T(F): 97.9 (17 Oct 2022 07:26), Max: 98.5 (16 Oct 2022 23:22)  HR: 100 (17 Oct 2022 07:26) (83 - 100)  BP: 119/89 (17 Oct 2022 07:26) (107/65 - 119/89)  BP(mean): --  RR: 16 (17 Oct 2022 07:26) (15 - 16)  SpO2: 94% (17 Oct 2022 07:26) (94% - 98%)    Parameters below as of 17 Oct 2022 07:26  Patient On (Oxygen Delivery Method): room air        PHYSICAL EXAM:  GENERAL: NAD, well-groomed, well-developed  HEAD:  Atraumatic, Normocephalic  ENMT: Moist mucous membranes,   NECK: Supple, No JVD, Normal thyroid  NERVOUS SYSTEM:  All 4 extremities mobile, no gross sensory deficits.   CHEST/LUNG: Clear to auscultation bilaterally; No rales, rhonchi, wheezing, or rubs  HEART: Regular rate and rhythm; No murmurs, rubs, or gallops  ABDOMEN: Soft, Nontender, Nondistended; Bowel sounds present  EXTREMITIES:  2+ Peripheral Pulses, No clubbing, cyanosis, or edema      LABS:                        13.1   6.24  )-----------( 266      ( 17 Oct 2022 08:03 )             39.2     17 Oct 2022 08:03    139    |  103    |  7      ----------------------------<  102    3.5     |  27     |  0.79     Ca    9.2        17 Oct 2022 08:03    TPro  7.2    /  Alb  3.5    /  TBili  0.3    /  DBili  x      /  AST  17     /  ALT  21     /  AlkPhos  52     17 Oct 2022 08:03        CAPILLARY BLOOD GLUCOSE          RADIOLOGY & ADDITIONAL TESTS:    Imaging Personally Reviewed:  [ ] YES     Consultant(s) Notes Reviewed:      Care Discussed with Consultants/Other Providers:    Advanced Directives: [ ] DNR  [ ] No feeding tube  [ ] MOLST in chart  [ ] MOLST completed today  [ ] Unknown

## 2022-10-17 NOTE — DISCHARGE NOTE PROVIDER - NSDCFUADDAPPT_GEN_ALL_CORE_FT
Please call Dr. PANCHITO Grover's office ([phone]) to schedule a follow-up appointment to be seen in 2 weeks.    You will need to follow up with the gastroenterologist as an outpatient for a follow up colonoscopy after resolution of the acute period.

## 2022-10-17 NOTE — DISCHARGE NOTE PROVIDER - NSDCMRMEDTOKEN_GEN_ALL_CORE_FT
Breo Ellipta 200 mcg-25 mcg/inh inhalation powder: 1 puff(s) inhaled once a day  cefpodoxime 200 mg oral tablet: 1 tab(s) orally 2 times a day   losartan 50 mg oral tablet: 1 tab(s) orally once a day  metFORMIN 1000 mg oral tablet, extended release: 1 tab(s) orally once a day  metroNIDAZOLE 500 mg oral tablet: 1 tab(s) orally 3 times a day   Multi Vitamin+: 1 tab(s) orally once a day  simvastatin 20 mg oral tablet: 1 tab(s) orally once a day (at bedtime)  Singulair 10 mg oral tablet: 1 tab(s) orally once a day  Spiriva Respimat: 2.5 microgram(s) inhaled 2 puffs once a day  Uloric 40 mg oral tablet: 1 tab(s) orally once a day

## 2022-10-17 NOTE — PROGRESS NOTE ADULT - SUBJECTIVE AND OBJECTIVE BOX
SURGERY    47 year old male admitted for acute sigmoid diverticulitis.    SUBJECTIVE:   Patient seen at bedside, no overnight events, no complaints noted and pain is controlled at this time.   Patient admits to flatus, bowel movement.   Patient denies any headaches, chest pain, shortness of breath, nausea, vomiting, fever, chills, weakness, dysuria  Patient A+Ox3 in NAD at time of visit.    OBJECTIVE:   T(F): 97.9 (10-17-22 @ 07:26), Max: 98.5 (10-16-22 @ 23:22)  HR: 100 (10-17-22 @ 07:26) (83 - 100)  BP: 119/89 (10-17-22 @ 07:26) (107/65 - 119/89)  RR: 16 (10-17-22 @ 07:26) (15 - 16)  SpO2: 94% (10-17-22 @ 07:26) (94% - 98%)  CAPILLARY BLOOD GLUCOSE        I&O's Detail      Physical exam:  General: A+O x 3 in NAD  HEENT: PERRLA, EOM intact  Neck: trachea midline  Chest: Clear through auscultation bilaterally, No rales, rhonchi wheezes noted bilaterally  Heart: S1,S1 RRR, no murmurs noted  Abdomen: soft non distended, non tender, non tympanic, BS x 4, no guarding noted  Incision: intact, no erythema noted  Extremities: no edema noted, warm,  no calf tenderness     MEDICATIONS  (STANDING):  budesonide 160 MICROgram(s)/formoterol 4.5 MICROgram(s) Inhaler 2 Puff(s) Inhalation two times a day  docusate sodium 100 milliGRAM(s) Oral three times a day  enoxaparin Injectable 40 milliGRAM(s) SubCutaneous every 24 hours  losartan 50 milliGRAM(s) Oral daily  montelukast 10 milliGRAM(s) Oral daily  pantoprazole  Injectable 40 milliGRAM(s) IV Push daily  piperacillin/tazobactam IVPB.. 3.375 Gram(s) IV Intermittent every 8 hours  simvastatin 20 milliGRAM(s) Oral at bedtime  sodium chloride 0.9%. 1000 milliLiter(s) (50 mL/Hr) IV Continuous <Continuous>  tiotropium 18 MICROgram(s) Capsule 1 Capsule(s) Inhalation daily    MEDICATIONS  (PRN):  acetaminophen     Tablet .. 650 milliGRAM(s) Oral every 6 hours PRN Temp greater or equal to 38C (100.4F), Mild Pain (1 - 3)  HYDROmorphone  Injectable 0.5 milliGRAM(s) IV Push every 3 hours PRN Severe Pain (7 - 10)  ketorolac   Injectable 30 milliGRAM(s) IV Push every 6 hours PRN Moderate Pain (4 - 6)  melatonin 5 milliGRAM(s) Oral at bedtime PRN Insomnia      LABS:                        13.1   6.24  )-----------( 266      ( 17 Oct 2022 08:03 )             39.2     10-17    139  |  103  |  7   ----------------------------<  102<H>  3.5   |  27  |  0.79    Ca    9.2      17 Oct 2022 08:03  Mg     2.0     10-16    TPro  7.2  /  Alb  3.5  /  TBili  0.3  /  DBili  x   /  AST  17  /  ALT  21  /  AlkPhos  52  10-17          RADIOLOGY & ADDITIONAL STUDIES:    Assessment  Patient is a 47y old  Male who presents with a chief complaint of Acute Sigmoid Diverticulitis (16 Oct 2022 18:58)      Plan      Surgical Team Contact Information     SURGERY    47 year old male admitted for acute sigmoid diverticulitis.    SUBJECTIVE:   Patient seen at bedside, no overnight events, no complaints noted and pain is controlled at this time.   Patient tolerating full liquid diet, admits to flatus, bowel movement. Denies N/V or abdominal pain.      OBJECTIVE:   T(F): 97.9 (10-17-22 @ 07:26), Max: 98.5 (10-16-22 @ 23:22)  HR: 100 (10-17-22 @ 07:26) (83 - 100)  BP: 119/89 (10-17-22 @ 07:26) (107/65 - 119/89)  RR: 16 (10-17-22 @ 07:26) (15 - 16)  SpO2: 94% (10-17-22 @ 07:26) (94% - 98%)      Physical exam:  General: A+O x 3 in NAD  Chest: Equal chest expansion  Heart: pulse regular  Abdomen: soft non distended, minimal tenderness to deep palpation of LLQ, no guarding no rebound  Extremities: no edema noted, warm,  no calf tenderness     MEDICATIONS  (STANDING):  budesonide 160 MICROgram(s)/formoterol 4.5 MICROgram(s) Inhaler 2 Puff(s) Inhalation two times a day  docusate sodium 100 milliGRAM(s) Oral three times a day  enoxaparin Injectable 40 milliGRAM(s) SubCutaneous every 24 hours  losartan 50 milliGRAM(s) Oral daily  montelukast 10 milliGRAM(s) Oral daily  pantoprazole  Injectable 40 milliGRAM(s) IV Push daily  piperacillin/tazobactam IVPB.. 3.375 Gram(s) IV Intermittent every 8 hours  simvastatin 20 milliGRAM(s) Oral at bedtime  sodium chloride 0.9%. 1000 milliLiter(s) (50 mL/Hr) IV Continuous <Continuous>  tiotropium 18 MICROgram(s) Capsule 1 Capsule(s) Inhalation daily    MEDICATIONS  (PRN):  acetaminophen     Tablet .. 650 milliGRAM(s) Oral every 6 hours PRN Temp greater or equal to 38C (100.4F), Mild Pain (1 - 3)  HYDROmorphone  Injectable 0.5 milliGRAM(s) IV Push every 3 hours PRN Severe Pain (7 - 10)  ketorolac   Injectable 30 milliGRAM(s) IV Push every 6 hours PRN Moderate Pain (4 - 6)  melatonin 5 milliGRAM(s) Oral at bedtime PRN Insomnia      LABS:                        13.1   6.24  )-----------( 266      ( 17 Oct 2022 08:03 )             39.2     10-17    139  |  103  |  7   ----------------------------<  102<H>  3.5   |  27  |  0.79    Ca    9.2      17 Oct 2022 08:03  Mg     2.0     10-16    TPro  7.2  /  Alb  3.5  /  TBili  0.3  /  DBili  x   /  AST  17  /  ALT  21  /  AlkPhos  52  10-17            Assessment  Patient is a 47y old  Male who presents with a chief complaint of Acute Sigmoid Diverticulitis    clinically improving overall  will advance to low fiber diet  discharge planning  Vantin/Flagyl at discharge  Outpatient GI follow up and colonoscopy  Case and plan D/W Dr. Scott.

## 2022-10-17 NOTE — DISCHARGE NOTE PROVIDER - CARE PROVIDER_API CALL
Karsten Scott)  Surgery  [address], [place] [id number]  Phone: [phone]  Fax: [phone]  Follow Up Time:

## 2022-10-18 ENCOUNTER — TRANSCRIPTION ENCOUNTER (OUTPATIENT)
Age: 47
End: 2022-10-18

## 2022-10-18 VITALS
SYSTOLIC BLOOD PRESSURE: 127 MMHG | RESPIRATION RATE: 18 BRPM | OXYGEN SATURATION: 96 % | TEMPERATURE: 98 F | HEART RATE: 102 BPM | DIASTOLIC BLOOD PRESSURE: 66 MMHG

## 2022-10-18 PROCEDURE — 96375 TX/PRO/DX INJ NEW DRUG ADDON: CPT

## 2022-10-18 PROCEDURE — 87635 SARS-COV-2 COVID-19 AMP PRB: CPT

## 2022-10-18 PROCEDURE — 81001 URINALYSIS AUTO W/SCOPE: CPT

## 2022-10-18 PROCEDURE — 94640 AIRWAY INHALATION TREATMENT: CPT

## 2022-10-18 PROCEDURE — 86900 BLOOD TYPING SEROLOGIC ABO: CPT

## 2022-10-18 PROCEDURE — 82962 GLUCOSE BLOOD TEST: CPT

## 2022-10-18 PROCEDURE — 99239 HOSP IP/OBS DSCHRG MGMT >30: CPT

## 2022-10-18 PROCEDURE — 85730 THROMBOPLASTIN TIME PARTIAL: CPT

## 2022-10-18 PROCEDURE — 36415 COLL VENOUS BLD VENIPUNCTURE: CPT

## 2022-10-18 PROCEDURE — G1004: CPT

## 2022-10-18 PROCEDURE — 99232 SBSQ HOSP IP/OBS MODERATE 35: CPT

## 2022-10-18 PROCEDURE — 83735 ASSAY OF MAGNESIUM: CPT

## 2022-10-18 PROCEDURE — 83605 ASSAY OF LACTIC ACID: CPT

## 2022-10-18 PROCEDURE — 83036 HEMOGLOBIN GLYCOSYLATED A1C: CPT

## 2022-10-18 PROCEDURE — 80048 BASIC METABOLIC PNL TOTAL CA: CPT

## 2022-10-18 PROCEDURE — 86901 BLOOD TYPING SEROLOGIC RH(D): CPT

## 2022-10-18 PROCEDURE — 86850 RBC ANTIBODY SCREEN: CPT

## 2022-10-18 PROCEDURE — 80053 COMPREHEN METABOLIC PANEL: CPT

## 2022-10-18 PROCEDURE — 99285 EMERGENCY DEPT VISIT HI MDM: CPT | Mod: 25

## 2022-10-18 PROCEDURE — 85025 COMPLETE CBC W/AUTO DIFF WBC: CPT

## 2022-10-18 PROCEDURE — 85610 PROTHROMBIN TIME: CPT

## 2022-10-18 PROCEDURE — 96376 TX/PRO/DX INJ SAME DRUG ADON: CPT

## 2022-10-18 PROCEDURE — 83690 ASSAY OF LIPASE: CPT

## 2022-10-18 PROCEDURE — 74177 CT ABD & PELVIS W/CONTRAST: CPT | Mod: ME

## 2022-10-18 PROCEDURE — 96374 THER/PROPH/DIAG INJ IV PUSH: CPT

## 2022-10-18 PROCEDURE — 85027 COMPLETE CBC AUTOMATED: CPT

## 2022-10-18 PROCEDURE — 94660 CPAP INITIATION&MGMT: CPT

## 2022-10-18 RX ADMIN — LOSARTAN POTASSIUM 50 MILLIGRAM(S): 100 TABLET, FILM COATED ORAL at 05:39

## 2022-10-18 RX ADMIN — ENOXAPARIN SODIUM 40 MILLIGRAM(S): 100 INJECTION SUBCUTANEOUS at 05:39

## 2022-10-18 RX ADMIN — Medication 100 MILLIGRAM(S): at 05:38

## 2022-10-18 RX ADMIN — MONTELUKAST 10 MILLIGRAM(S): 4 TABLET, CHEWABLE ORAL at 12:33

## 2022-10-18 RX ADMIN — BUDESONIDE AND FORMOTEROL FUMARATE DIHYDRATE 2 PUFF(S): 160; 4.5 AEROSOL RESPIRATORY (INHALATION) at 06:46

## 2022-10-18 RX ADMIN — TIOTROPIUM BROMIDE 1 CAPSULE(S): 18 CAPSULE ORAL; RESPIRATORY (INHALATION) at 06:46

## 2022-10-18 RX ADMIN — PIPERACILLIN AND TAZOBACTAM 25 GRAM(S): 4; .5 INJECTION, POWDER, LYOPHILIZED, FOR SOLUTION INTRAVENOUS at 05:39

## 2022-10-18 RX ADMIN — PANTOPRAZOLE SODIUM 40 MILLIGRAM(S): 20 TABLET, DELAYED RELEASE ORAL at 12:34

## 2022-10-18 NOTE — PROGRESS NOTE ADULT - SUBJECTIVE AND OBJECTIVE BOX
SURGERY    47 year old male admitted for acute sigmoid diverticulitis.    SUBJECTIVE:   Patient seen at bedside, no overnight events, no complaints noted and pain is controlled at this time. Patient tolerating regular diet. Passing flatus. Last bowel movement on Sunday. Denies N/V or abdominal pain.    OBJECTIVE:   Vital Signs Last 24 Hrs  T(C): 36.6 (18 Oct 2022 07:51), Max: 36.9 (17 Oct 2022 15:29)  T(F): 97.9 (18 Oct 2022 07:51), Max: 98.4 (17 Oct 2022 15:29)  HR: 102 (18 Oct 2022 07:51) (85 - 102)  BP: 127/66 (18 Oct 2022 07:51) (105/67 - 127/66)  BP(mean): --  RR: 18 (18 Oct 2022 07:51) (16 - 18)  SpO2: 96% (18 Oct 2022 07:51) (94% - 96%)    Physical exam:  General: A+O x 3 in NAD  Chest: breathing comfortable on room air  Heart: pulse regular  Abdomen: soft non distended, non tender, no guarding no rebound  Extremities: no edema noted, warm, no calf tenderness     MEDICATIONS  (STANDING):  budesonide 160 MICROgram(s)/formoterol 4.5 MICROgram(s) Inhaler 2 Puff(s) Inhalation two times a day  docusate sodium 100 milliGRAM(s) Oral three times a day  enoxaparin Injectable 40 milliGRAM(s) SubCutaneous every 24 hours  losartan 50 milliGRAM(s) Oral daily  montelukast 10 milliGRAM(s) Oral daily  pantoprazole  Injectable 40 milliGRAM(s) IV Push daily  piperacillin/tazobactam IVPB.. 3.375 Gram(s) IV Intermittent every 8 hours  simvastatin 20 milliGRAM(s) Oral at bedtime  tiotropium 18 MICROgram(s) Capsule 1 Capsule(s) Inhalation daily    MEDICATIONS  (PRN):  acetaminophen     Tablet .. 650 milliGRAM(s) Oral every 6 hours PRN Temp greater or equal to 38C (100.4F), Mild Pain (1 - 3)  HYDROmorphone  Injectable 0.5 milliGRAM(s) IV Push every 3 hours PRN Severe Pain (7 - 10)  ketorolac   Injectable 30 milliGRAM(s) IV Push every 6 hours PRN Moderate Pain (4 - 6)  melatonin 5 milliGRAM(s) Oral at bedtime PRN Insomnia    LABS:                       13.1   6.24  )-----------( 266      ( 17 Oct 2022 08:03 )             39.2     10-17    139  |  103  |  7   ----------------------------<  102<H>  3.5   |  27  |  0.79    Ca    9.2      17 Oct 2022 08:03    TPro  7.2  /  Alb  3.5  /  TBili  0.3  /  DBili  x   /  AST  17  /  ALT  21  /  AlkPhos  52  10-17    Assessment  Patient is a 47y old  Male who presented with Acute Sigmoid Diverticulitis. Pain free, and tolerating diet.  - Continue low fiber diet  - Discharge planning  - Vantin/Flagyl at discharge  - Outpatient GI follow up and colonoscopy

## 2022-10-18 NOTE — PROGRESS NOTE ADULT - ASSESSMENT
47M with HTN, DM2, Asthma, VIOLETA, and Gout admitted for Acute Sigmoid Diverticulitis     Acute Sigmoid Diverticulitis   IV Zosyn + Pain control   Imaging reviewed   Non operative management as of now   Management as per surgery   tolerateind clearts, will advance diet to full liquid    HTN / HLD  Continue Losartan + Statin    DM2  Hold Metformin  Patients A1C is not elevated outpatient so will monitor BS on BMP  Will not do finger sticks until deemed necessary     Asthma / VIOLETA  Severe Asthma; Will continue home inhalers   Uses CPAP at bedtime 15/12    Gout  Hold meds    Diet  Regular    DVT Prophylaxis  Lovenox    Disposition   Discharge home as per surgery

## 2022-10-18 NOTE — DISCHARGE NOTE NURSING/CASE MANAGEMENT/SOCIAL WORK - NSDCPEFALRISK_GEN_ALL_CORE
For information on Fall & Injury Prevention, visit: [email or web address] OR  [email or web address] OR  https://www.cdc.gov/steadi/patient.html

## 2022-10-18 NOTE — DISCHARGE NOTE NURSING/CASE MANAGEMENT/SOCIAL WORK - PATIENT PORTAL LINK FT
You can access the FollowMyHealth Patient Portal offered by [facility] by registering at the following website: [email or web address]. By joining [employer]’s FollowMyHealth portal, you will also be able to view your health information using other applications (apps) compatible with our system.

## 2022-10-18 NOTE — PROGRESS NOTE ADULT - ASSESSMENT
As in above resident notation.  Mr. Whyte personally seen and examined during AM rounds prior to discharge.  Vitals non-suggestive.  Abdomen soft and non tense and completely non tender.  Labs reassuring.  Clinically, improved overall.  Surgically, stable for discharge.  To home on low residue diet with completion of therapy with oral ABX.  Encouraged to call with questions or concerns.  Office follow-up in 1 to 2 weeks with referral to GI for interval colonoscopy.

## 2022-10-18 NOTE — PROGRESS NOTE ADULT - SUBJECTIVE AND OBJECTIVE BOX
Subjective: Doing well with no overnight events. Pain improved.     MEDICATIONS  (STANDING):  budesonide 160 MICROgram(s)/formoterol 4.5 MICROgram(s) Inhaler 2 Puff(s) Inhalation two times a day  docusate sodium 100 milliGRAM(s) Oral three times a day  enoxaparin Injectable 40 milliGRAM(s) SubCutaneous every 24 hours  losartan 50 milliGRAM(s) Oral daily  montelukast 10 milliGRAM(s) Oral daily  pantoprazole  Injectable 40 milliGRAM(s) IV Push daily  piperacillin/tazobactam IVPB.. 3.375 Gram(s) IV Intermittent every 8 hours  simvastatin 20 milliGRAM(s) Oral at bedtime  tiotropium 18 MICROgram(s) Capsule 1 Capsule(s) Inhalation daily    MEDICATIONS  (PRN):  acetaminophen     Tablet .. 650 milliGRAM(s) Oral every 6 hours PRN Temp greater or equal to 38C (100.4F), Mild Pain (1 - 3)  HYDROmorphone  Injectable 0.5 milliGRAM(s) IV Push every 3 hours PRN Severe Pain (7 - 10)  ketorolac   Injectable 30 milliGRAM(s) IV Push every 6 hours PRN Moderate Pain (4 - 6)  melatonin 5 milliGRAM(s) Oral at bedtime PRN Insomnia      Allergies    No Known Allergies    Intolerances        Vital Signs Last 24 Hrs  T(C): 36.6 (18 Oct 2022 07:51), Max: 36.9 (17 Oct 2022 15:29)  T(F): 97.9 (18 Oct 2022 07:51), Max: 98.4 (17 Oct 2022 15:29)  HR: 102 (18 Oct 2022 07:51) (85 - 102)  BP: 127/66 (18 Oct 2022 07:51) (105/67 - 127/66)  BP(mean): --  RR: 18 (18 Oct 2022 07:51) (16 - 18)  SpO2: 96% (18 Oct 2022 07:51) (94% - 96%)    Parameters below as of 18 Oct 2022 07:51  Patient On (Oxygen Delivery Method): room air        PHYSICAL EXAM:  GENERAL: NAD, well-groomed, well-developed  HEAD:  Atraumatic, Normocephalic  ENMT: Moist mucous membranes,   NECK: Supple, No JVD, Normal thyroid  NERVOUS SYSTEM:  All 4 extremities mobile, no gross sensory deficits.   CHEST/LUNG: Clear to auscultation bilaterally; No rales, rhonchi, wheezing, or rubs  HEART: Regular rate and rhythm; No murmurs, rubs, or gallops  ABDOMEN: Soft, Nontender, Nondistended; Bowel sounds present  EXTREMITIES:  2+ Peripheral Pulses, No clubbing, cyanosis, or edema      LABS:      Ca    9.2        17 Oct 2022 08:03          CAPILLARY BLOOD GLUCOSE          RADIOLOGY & ADDITIONAL TESTS:    Imaging Personally Reviewed:  [ ] YES     Consultant(s) Notes Reviewed:      Care Discussed with Consultants/Other Providers:    Advanced Directives: [ ] DNR  [ ] No feeding tube  [ ] MOLST in chart  [ ] MOLST completed today  [ ] Unknown

## 2022-10-18 NOTE — DISCHARGE NOTE NURSING/CASE MANAGEMENT/SOCIAL WORK - NSDCFUADDAPPT_GEN_ALL_CORE_FT
Please call Dr. PANCHITO Grover's office ([phone]) to schedule a follow-up appointment to be seen in 2 weeks.    You will need to follow up with the gastroenterologist as an outpatient for a follow up colonoscopy after resolution of the acute period.

## 2022-10-18 NOTE — PROGRESS NOTE ADULT - TIME BILLING
Reviewed with patient in detail, Hospitalist, and today's RN.
Reviewed with patient in detail, Hospitalist and today's RN.
Reviewed with patient in detail, Surgical PA and today's/ tonight's RN team.
Reviewed with patient in detail, Surgical Resident and PA as well as today's RN.
Reviewed with patient in detail, Surgical team and today's RN team.

## 2022-10-18 NOTE — PROGRESS NOTE ADULT - REASON FOR ADMISSION
Acute Sigmoid Diverticulitis

## 2022-10-20 PROBLEM — G47.33 OBSTRUCTIVE SLEEP APNEA (ADULT) (PEDIATRIC): Chronic | Status: ACTIVE | Noted: 2022-10-13

## 2022-10-25 ENCOUNTER — APPOINTMENT (OUTPATIENT)
Dept: CT IMAGING | Facility: CLINIC | Age: 47
End: 2022-10-25

## 2022-10-25 ENCOUNTER — APPOINTMENT (OUTPATIENT)
Dept: SURGERY | Facility: CLINIC | Age: 47
End: 2022-10-25

## 2022-10-25 ENCOUNTER — OUTPATIENT (OUTPATIENT)
Dept: OUTPATIENT SERVICES | Facility: HOSPITAL | Age: 47
LOS: 1 days | End: 2022-10-25
Payer: COMMERCIAL

## 2022-10-25 ENCOUNTER — RESULT REVIEW (OUTPATIENT)
Age: 47
End: 2022-10-25

## 2022-10-25 ENCOUNTER — NON-APPOINTMENT (OUTPATIENT)
Age: 47
End: 2022-10-25

## 2022-10-25 VITALS
OXYGEN SATURATION: 95 % | TEMPERATURE: 97.2 F | DIASTOLIC BLOOD PRESSURE: 80 MMHG | HEART RATE: 79 BPM | BODY MASS INDEX: 36.07 KG/M2 | WEIGHT: 238 LBS | HEIGHT: 68 IN | SYSTOLIC BLOOD PRESSURE: 122 MMHG

## 2022-10-25 DIAGNOSIS — K57.32 DIVERTICULITIS OF LARGE INTESTINE WITHOUT PERFORATION OR ABSCESS WITHOUT BLEEDING: ICD-10-CM

## 2022-10-25 DIAGNOSIS — K57.90 DIVERTICULOSIS OF INTESTINE, PART UNSPECIFIED, W/OUT PERFORATION OR ABSCESS W/OUT BLEEDING: ICD-10-CM

## 2022-10-25 DIAGNOSIS — D49.89 NEOPLASM OF UNSPECIFIED BEHAVIOR OF OTHER SPECIFIED SITES: Chronic | ICD-10-CM

## 2022-10-25 DIAGNOSIS — K80.20 CALCULUS OF GALLBLADDER W/OUT CHOLECYSTITIS W/OUT OBSTRUCTION: ICD-10-CM

## 2022-10-25 LAB
ALBUMIN SERPL ELPH-MCNC: 4.7 G/DL
ALP BLD-CCNC: 61 U/L
ALT SERPL-CCNC: 55 U/L
ANION GAP SERPL CALC-SCNC: 12 MMOL/L
AST SERPL-CCNC: 37 U/L
BASOPHILS # BLD AUTO: 0.09 K/UL
BASOPHILS NFR BLD AUTO: 1 %
BILIRUB SERPL-MCNC: 0.3 MG/DL
BUN SERPL-MCNC: 14 MG/DL
CALCIUM SERPL-MCNC: 10.4 MG/DL
CHLORIDE SERPL-SCNC: 100 MMOL/L
CO2 SERPL-SCNC: 25 MMOL/L
CREAT SERPL-MCNC: 0.8 MG/DL
EGFR: 111 ML/MIN/1.73M2
EOSINOPHIL # BLD AUTO: 0.16 K/UL
EOSINOPHIL NFR BLD AUTO: 1.7 %
GLUCOSE SERPL-MCNC: 89 MG/DL
HCT VFR BLD CALC: 43.8 %
HGB BLD-MCNC: 14.3 G/DL
IMM GRANULOCYTES NFR BLD AUTO: 1 %
LYMPHOCYTES # BLD AUTO: 2.34 K/UL
LYMPHOCYTES NFR BLD AUTO: 25.5 %
MAN DIFF?: NORMAL
MCHC RBC-ENTMCNC: 27.9 PG
MCHC RBC-ENTMCNC: 32.6 GM/DL
MCV RBC AUTO: 85.5 FL
MONOCYTES # BLD AUTO: 0.5 K/UL
MONOCYTES NFR BLD AUTO: 5.5 %
NEUTROPHILS # BLD AUTO: 5.98 K/UL
NEUTROPHILS NFR BLD AUTO: 65.3 %
PLATELET # BLD AUTO: 300 K/UL
POTASSIUM SERPL-SCNC: 4.6 MMOL/L
PROT SERPL-MCNC: 7.4 G/DL
RBC # BLD: 5.12 M/UL
RBC # FLD: 12.9 %
SODIUM SERPL-SCNC: 138 MMOL/L
WBC # FLD AUTO: 9.16 K/UL

## 2022-10-25 PROCEDURE — 74177 CT ABD & PELVIS W/CONTRAST: CPT | Mod: 26

## 2022-10-25 PROCEDURE — 74177 CT ABD & PELVIS W/CONTRAST: CPT

## 2022-10-25 PROCEDURE — 99215 OFFICE O/P EST HI 40 MIN: CPT

## 2022-10-25 RX ORDER — SIMVASTATIN 10 MG/1
10 TABLET, FILM COATED ORAL
Refills: 0 | Status: ACTIVE | COMMUNITY

## 2022-10-25 RX ORDER — TIOTROPIUM BROMIDE INHALATION SPRAY 3.12 UG/1
2.5 SPRAY, METERED RESPIRATORY (INHALATION)
Refills: 0 | Status: ACTIVE | COMMUNITY

## 2022-10-25 RX ORDER — MONTELUKAST SODIUM 10 MG/1
10 TABLET, FILM COATED ORAL
Refills: 0 | Status: ACTIVE | COMMUNITY

## 2022-10-25 RX ORDER — SEMAGLUTIDE 2.4 MG/.75ML
2.4 INJECTION, SOLUTION SUBCUTANEOUS
Refills: 0 | Status: ACTIVE | COMMUNITY

## 2022-10-25 RX ORDER — LOSARTAN POTASSIUM 50 MG/1
50 TABLET, FILM COATED ORAL
Refills: 0 | Status: ACTIVE | COMMUNITY

## 2022-10-25 RX ORDER — METFORMIN HYDROCHLORIDE 1000 MG/1
1000 TABLET, COATED ORAL
Refills: 0 | Status: ACTIVE | COMMUNITY

## 2022-10-25 RX ORDER — FEBUXOSTAT 40 MG/1
40 TABLET ORAL
Refills: 0 | Status: ACTIVE | COMMUNITY

## 2022-10-25 RX ORDER — OMALIZUMAB 202.5 MG/1.4ML
INJECTION, SOLUTION SUBCUTANEOUS
Refills: 0 | Status: ACTIVE | COMMUNITY

## 2022-10-25 RX ORDER — FLUTICASONE FUROATE AND VILANTEROL TRIFENATATE 200; 25 UG/1; UG/1
200-25 POWDER RESPIRATORY (INHALATION)
Refills: 0 | Status: ACTIVE | COMMUNITY

## 2022-10-25 NOTE — PHYSICAL EXAM
[Respiratory Effort] : normal respiratory effort [Normal Rate and Rhythm] : normal rate and rhythm [No HSM] : no hepatosplenomegaly [No Rash or Lesion] : No rash or lesion [Alert] : alert [Oriented to Person] : oriented to person [Oriented to Place] : oriented to place [Oriented to Time] : oriented to time [Anxious] : anxious [Tender] : was nontender [Enlarged] : not enlarged [de-identified] : Appears well, no acute distress, ambulates easily into office and assumes examination table without need of assistance.  [de-identified] : No cervical, supraclavicular, axillary or inguinal adenopathy.  [de-identified] : Supple with full range of motion.  [FreeTextEntry1] : No cervical, supraclavicular, axillary or inguinal adenopathy.  [de-identified] : Deferred.  [de-identified] : Full/ obese, but soft/ non tense.\par No visible fullness in LLQ.\par No palpable fullness to lower abdomen.\par Mild reported tenderness to deepest palpation of LLQ without any guarding/ rebound/ referred pain. [de-identified] : Deferred.  [de-identified] : Deferred.  [de-identified] : Grossly symmetric and within normal limits without motor or sensory deficits.  [de-identified] : though appropriately so...

## 2022-10-25 NOTE — DATA REVIEWED
[FreeTextEntry1] : ACC: [id number] EXAM:  CT ABDOMEN AND PELVIS IC                      \par PROCEDURE DATE:  10/13/2022  \par INTERPRETATION:  CLINICAL INFORMATION: Acute abdominal pain\par COMPARISON: None.\par CONTRAST/COMPLICATIONS:\par IV Contrast: Omnipaque 350  90 cc administered   10 cc discarded\par Oral Contrast: NONE\par Complications: None reported at time of study completion\par PROCEDURE:\par CT of the Abdomen and Pelvis was performed.\par Sagittal and coronal reformats were performed.\par FINDINGS:\par LOWER CHEST: Partially visualized right-sided bronchiectasis.\par LIVER: Hepatomegaly. No focal hepatic masses.\par BILE DUCTS: Normal caliber.\par GALLBLADDER: Gallstones.\par SPLEEN: Within normal limits.\par PANCREAS: Within normal limits.\par ADRENALS: Within normal limits.\par KIDNEYS/URETERS: Kidneys enhance bilaterally and symmetrically without \par hydronephrosis. Bilateral renal cysts one of which on the right \par demonstrates peripheral calcification. Subcentimeter hypodensity in the \par left lower pole too small to characterize. No intrarenal calculi. The \par ureters are unremarkable.\par BLADDER: Reactive thickening of the adjacent bladder wall.\par REPRODUCTIVE ORGANS: Prostate within normal limits.\par BOWEL: Acute sigmoid diverticulitis with adjacent pericolonic \par infiltrative changes and pericolonic fluid. No discrete drainable fluid \par collections. No bowel obstruction. Additional scattered colonic \par diverticula. Normal appendix. Periampullary duodenal diverticula.\par PERITONEUM: Trace to small pelvic ascites. No free air. No drainable \par fluid collections.\par VESSELS: Within normal limits.\par RETROPERITONEUM/LYMPH NODES: No lymphadenopathy.\par ABDOMINAL WALL: Within normal limits.\par BONES: Degenerative changes.\par IMPRESSION:\par Acute sigmoid diverticulitis.\par --- End of Report ---\par VIKAS LOO MD; Attending Radiologist\par This document has been electronically signed. Oct 13 2022 12:40PM\par

## 2022-10-25 NOTE — HISTORY OF PRESENT ILLNESS
[de-identified] : Very pleasant gentleman presenting to the office today in the company of his wife under direction from my office.\par Mr. Whyte mainly complains of lingering low grade fatigue and a sense of malaise.\par He denies any nausea or vomiting, but reports poor appetite.\par He is passing ongoing flatus, but mostly liquid stools.\par Mr. Whyte is presently completing his course of oral ABX and denies any abdominal pain at rest.\par However, he his main concern is that "I just don't feel too well... I thought I would be better overall by now..."

## 2022-10-25 NOTE — PLAN
[FreeTextEntry1] : Recent episode of sigmoid diverticulitis.  Mr. Whyte has done well initially with course of IV converted to PO ABX.\par However, now with complaint of fatigue, malaise and loose stool.\par Denies any pain at rest.\par Vitals reassuring in office today.\par Exam with +/- mild tenderness to deepest palpation of LLQ/ roxi abdomen without peritoneal findings.\par Concern now for persistent diverticulitis or post treatment abscess.\par He is hopeful of avoiding ER evaluation or admission at present.\par Overall, he appears well in the immediate sense.\par As such, will hold off on additional ABX for present and obtain follow-up labs and interval imaging today.\par He is pleased and his wife agrees.  Pending above, further recommendations to follow.\par Please note that more than 50% of face to face time was spent in counseling and coordination of care.

## 2022-10-25 NOTE — REVIEW OF SYSTEMS
[Feeling Poorly] : feeling poorly [Feeling Tired] : feeling tired [As Noted in HPI] : as noted in HPI [Anxiety] : anxiety [Negative] : Heme/Lymph [Depression] : no depression [de-identified] : regarding this issue and visit...

## 2022-10-26 ENCOUNTER — NON-APPOINTMENT (OUTPATIENT)
Age: 47
End: 2022-10-26

## 2022-10-26 ENCOUNTER — INPATIENT (INPATIENT)
Facility: HOSPITAL | Age: 47
LOS: 2 days | Discharge: HOME | DRG: 392 | End: 2022-10-29
Attending: SURGERY | Admitting: SURGERY
Payer: COMMERCIAL

## 2022-10-26 VITALS
HEART RATE: 88 BPM | HEIGHT: 68 IN | WEIGHT: 231.93 LBS | TEMPERATURE: 97 F | DIASTOLIC BLOOD PRESSURE: 80 MMHG | OXYGEN SATURATION: 97 % | SYSTOLIC BLOOD PRESSURE: 119 MMHG | RESPIRATION RATE: 18 BRPM

## 2022-10-26 DIAGNOSIS — E78.5 HYPERLIPIDEMIA, UNSPECIFIED: ICD-10-CM

## 2022-10-26 DIAGNOSIS — K57.92 DIVERTICULITIS OF INTESTINE, PART UNSPECIFIED, WITHOUT PERFORATION OR ABSCESS WITHOUT BLEEDING: ICD-10-CM

## 2022-10-26 DIAGNOSIS — J45.909 UNSPECIFIED ASTHMA, UNCOMPLICATED: ICD-10-CM

## 2022-10-26 DIAGNOSIS — D49.89 NEOPLASM OF UNSPECIFIED BEHAVIOR OF OTHER SPECIFIED SITES: Chronic | ICD-10-CM

## 2022-10-26 DIAGNOSIS — G47.33 OBSTRUCTIVE SLEEP APNEA (ADULT) (PEDIATRIC): ICD-10-CM

## 2022-10-26 DIAGNOSIS — Z29.9 ENCOUNTER FOR PROPHYLACTIC MEASURES, UNSPECIFIED: ICD-10-CM

## 2022-10-26 DIAGNOSIS — I10 ESSENTIAL (PRIMARY) HYPERTENSION: ICD-10-CM

## 2022-10-26 DIAGNOSIS — M10.9 GOUT, UNSPECIFIED: ICD-10-CM

## 2022-10-26 DIAGNOSIS — E11.9 TYPE 2 DIABETES MELLITUS WITHOUT COMPLICATIONS: ICD-10-CM

## 2022-10-26 LAB
ALBUMIN SERPL ELPH-MCNC: 4.2 G/DL — SIGNIFICANT CHANGE UP (ref 3.3–5)
ALP SERPL-CCNC: 55 U/L — SIGNIFICANT CHANGE UP (ref 40–120)
ALT FLD-CCNC: 69 U/L — SIGNIFICANT CHANGE UP (ref 12–78)
ANION GAP SERPL CALC-SCNC: 6 MMOL/L — SIGNIFICANT CHANGE UP (ref 5–17)
APTT BLD: 39.1 SEC — HIGH (ref 27.5–35.5)
AST SERPL-CCNC: 38 U/L — HIGH (ref 15–37)
BASOPHILS # BLD AUTO: 0.06 K/UL — SIGNIFICANT CHANGE UP (ref 0–0.2)
BASOPHILS NFR BLD AUTO: 0.7 % — SIGNIFICANT CHANGE UP (ref 0–2)
BILIRUB SERPL-MCNC: 0.5 MG/DL — SIGNIFICANT CHANGE UP (ref 0.2–1.2)
BUN SERPL-MCNC: 15 MG/DL — SIGNIFICANT CHANGE UP (ref 7–23)
CALCIUM SERPL-MCNC: 9.3 MG/DL — SIGNIFICANT CHANGE UP (ref 8.5–10.1)
CHLORIDE SERPL-SCNC: 106 MMOL/L — SIGNIFICANT CHANGE UP (ref 96–108)
CO2 SERPL-SCNC: 28 MMOL/L — SIGNIFICANT CHANGE UP (ref 22–31)
CREAT SERPL-MCNC: 0.8 MG/DL — SIGNIFICANT CHANGE UP (ref 0.5–1.3)
EGFR: 111 ML/MIN/1.73M2 — SIGNIFICANT CHANGE UP
EOSINOPHIL # BLD AUTO: 0.17 K/UL — SIGNIFICANT CHANGE UP (ref 0–0.5)
EOSINOPHIL NFR BLD AUTO: 2 % — SIGNIFICANT CHANGE UP (ref 0–6)
GLUCOSE SERPL-MCNC: 89 MG/DL — SIGNIFICANT CHANGE UP (ref 70–99)
HCT VFR BLD CALC: 42.6 % — SIGNIFICANT CHANGE UP (ref 39–50)
HGB BLD-MCNC: 14.1 G/DL — SIGNIFICANT CHANGE UP (ref 13–17)
IMM GRANULOCYTES NFR BLD AUTO: 0.9 % — SIGNIFICANT CHANGE UP (ref 0–0.9)
INR BLD: 1.19 RATIO — HIGH (ref 0.88–1.16)
LYMPHOCYTES # BLD AUTO: 2.26 K/UL — SIGNIFICANT CHANGE UP (ref 1–3.3)
LYMPHOCYTES # BLD AUTO: 26.3 % — SIGNIFICANT CHANGE UP (ref 13–44)
MAGNESIUM SERPL-MCNC: 1.9 MG/DL — SIGNIFICANT CHANGE UP (ref 1.6–2.6)
MCHC RBC-ENTMCNC: 28.1 PG — SIGNIFICANT CHANGE UP (ref 27–34)
MCHC RBC-ENTMCNC: 33.1 GM/DL — SIGNIFICANT CHANGE UP (ref 32–36)
MCV RBC AUTO: 84.9 FL — SIGNIFICANT CHANGE UP (ref 80–100)
MONOCYTES # BLD AUTO: 0.46 K/UL — SIGNIFICANT CHANGE UP (ref 0–0.9)
MONOCYTES NFR BLD AUTO: 5.4 % — SIGNIFICANT CHANGE UP (ref 2–14)
NEUTROPHILS # BLD AUTO: 5.55 K/UL — SIGNIFICANT CHANGE UP (ref 1.8–7.4)
NEUTROPHILS NFR BLD AUTO: 64.7 % — SIGNIFICANT CHANGE UP (ref 43–77)
NRBC # BLD: 0 /100 WBCS — SIGNIFICANT CHANGE UP (ref 0–0)
PLATELET # BLD AUTO: 264 K/UL — SIGNIFICANT CHANGE UP (ref 150–400)
POTASSIUM SERPL-MCNC: 3.9 MMOL/L — SIGNIFICANT CHANGE UP (ref 3.5–5.3)
POTASSIUM SERPL-SCNC: 3.9 MMOL/L — SIGNIFICANT CHANGE UP (ref 3.5–5.3)
PROT SERPL-MCNC: 8 G/DL — SIGNIFICANT CHANGE UP (ref 6–8.3)
PROTHROM AB SERPL-ACNC: 13.9 SEC — HIGH (ref 10.5–13.4)
RBC # BLD: 5.02 M/UL — SIGNIFICANT CHANGE UP (ref 4.2–5.8)
RBC # FLD: 12.9 % — SIGNIFICANT CHANGE UP (ref 10.3–14.5)
SARS-COV-2 RNA SPEC QL NAA+PROBE: SIGNIFICANT CHANGE UP
SODIUM SERPL-SCNC: 140 MMOL/L — SIGNIFICANT CHANGE UP (ref 135–145)
WBC # BLD: 8.58 K/UL — SIGNIFICANT CHANGE UP (ref 3.8–10.5)
WBC # FLD AUTO: 8.58 K/UL — SIGNIFICANT CHANGE UP (ref 3.8–10.5)

## 2022-10-26 PROCEDURE — 99222 1ST HOSP IP/OBS MODERATE 55: CPT

## 2022-10-26 PROCEDURE — 99223 1ST HOSP IP/OBS HIGH 75: CPT | Mod: GC

## 2022-10-26 PROCEDURE — 99285 EMERGENCY DEPT VISIT HI MDM: CPT

## 2022-10-26 PROCEDURE — 71045 X-RAY EXAM CHEST 1 VIEW: CPT | Mod: 26

## 2022-10-26 RX ORDER — PIPERACILLIN AND TAZOBACTAM 4; .5 G/20ML; G/20ML
3.38 INJECTION, POWDER, LYOPHILIZED, FOR SOLUTION INTRAVENOUS ONCE
Refills: 0 | Status: COMPLETED | OUTPATIENT
Start: 2022-10-26 | End: 2022-10-26

## 2022-10-26 RX ORDER — LOSARTAN POTASSIUM 100 MG/1
1 TABLET, FILM COATED ORAL
Qty: 0 | Refills: 0 | DISCHARGE

## 2022-10-26 RX ORDER — IBUPROFEN 200 MG
600 TABLET ORAL EVERY 6 HOURS
Refills: 0 | Status: DISCONTINUED | OUTPATIENT
Start: 2022-10-26 | End: 2022-10-29

## 2022-10-26 RX ORDER — TIOTROPIUM BROMIDE 18 UG/1
1 CAPSULE ORAL; RESPIRATORY (INHALATION) DAILY
Refills: 0 | Status: DISCONTINUED | OUTPATIENT
Start: 2022-10-26 | End: 2022-10-29

## 2022-10-26 RX ORDER — SIMVASTATIN 20 MG/1
20 TABLET, FILM COATED ORAL AT BEDTIME
Refills: 0 | Status: DISCONTINUED | OUTPATIENT
Start: 2022-10-26 | End: 2022-10-29

## 2022-10-26 RX ORDER — DEXTROSE 50 % IN WATER 50 %
12.5 SYRINGE (ML) INTRAVENOUS ONCE
Refills: 0 | Status: DISCONTINUED | OUTPATIENT
Start: 2022-10-26 | End: 2022-10-29

## 2022-10-26 RX ORDER — SODIUM CHLORIDE 9 MG/ML
1000 INJECTION, SOLUTION INTRAVENOUS
Refills: 0 | Status: DISCONTINUED | OUTPATIENT
Start: 2022-10-26 | End: 2022-10-29

## 2022-10-26 RX ORDER — DEXTROSE 50 % IN WATER 50 %
25 SYRINGE (ML) INTRAVENOUS ONCE
Refills: 0 | Status: DISCONTINUED | OUTPATIENT
Start: 2022-10-26 | End: 2022-10-29

## 2022-10-26 RX ORDER — DEXTROSE 50 % IN WATER 50 %
15 SYRINGE (ML) INTRAVENOUS ONCE
Refills: 0 | Status: DISCONTINUED | OUTPATIENT
Start: 2022-10-26 | End: 2022-10-29

## 2022-10-26 RX ORDER — BUDESONIDE AND FORMOTEROL FUMARATE DIHYDRATE 160; 4.5 UG/1; UG/1
2 AEROSOL RESPIRATORY (INHALATION)
Refills: 0 | Status: DISCONTINUED | OUTPATIENT
Start: 2022-10-26 | End: 2022-10-29

## 2022-10-26 RX ORDER — FLUTICASONE FUROATE AND VILANTEROL TRIFENATATE 100; 25 UG/1; UG/1
1 POWDER RESPIRATORY (INHALATION)
Qty: 0 | Refills: 0 | DISCHARGE

## 2022-10-26 RX ORDER — MONTELUKAST 4 MG/1
10 TABLET, CHEWABLE ORAL DAILY
Refills: 0 | Status: DISCONTINUED | OUTPATIENT
Start: 2022-10-26 | End: 2022-10-29

## 2022-10-26 RX ORDER — ERGOCALCIFEROL 1.25 MG/1
1 CAPSULE ORAL
Qty: 0 | Refills: 0 | DISCHARGE

## 2022-10-26 RX ORDER — PANTOPRAZOLE SODIUM 20 MG/1
40 TABLET, DELAYED RELEASE ORAL DAILY
Refills: 0 | Status: DISCONTINUED | OUTPATIENT
Start: 2022-10-26 | End: 2022-10-29

## 2022-10-26 RX ORDER — ACETAMINOPHEN 500 MG
1000 TABLET ORAL EVERY 6 HOURS
Refills: 0 | Status: DISCONTINUED | OUTPATIENT
Start: 2022-10-26 | End: 2022-10-29

## 2022-10-26 RX ORDER — ONDANSETRON 8 MG/1
4 TABLET, FILM COATED ORAL ONCE
Refills: 0 | Status: COMPLETED | OUTPATIENT
Start: 2022-10-26 | End: 2022-10-26

## 2022-10-26 RX ORDER — LOSARTAN POTASSIUM 100 MG/1
50 TABLET, FILM COATED ORAL DAILY
Refills: 0 | Status: DISCONTINUED | OUTPATIENT
Start: 2022-10-26 | End: 2022-10-29

## 2022-10-26 RX ORDER — GLUCAGON INJECTION, SOLUTION 0.5 MG/.1ML
1 INJECTION, SOLUTION SUBCUTANEOUS ONCE
Refills: 0 | Status: DISCONTINUED | OUTPATIENT
Start: 2022-10-26 | End: 2022-10-29

## 2022-10-26 RX ORDER — ENOXAPARIN SODIUM 100 MG/ML
40 INJECTION SUBCUTANEOUS EVERY 24 HOURS
Refills: 0 | Status: DISCONTINUED | OUTPATIENT
Start: 2022-10-26 | End: 2022-10-29

## 2022-10-26 RX ORDER — TIOTROPIUM BROMIDE 18 UG/1
2.5 CAPSULE ORAL; RESPIRATORY (INHALATION)
Qty: 0 | Refills: 0 | DISCHARGE

## 2022-10-26 RX ORDER — INSULIN LISPRO 100/ML
VIAL (ML) SUBCUTANEOUS
Refills: 0 | Status: DISCONTINUED | OUTPATIENT
Start: 2022-10-26 | End: 2022-10-29

## 2022-10-26 RX ORDER — LANOLIN ALCOHOL/MO/W.PET/CERES
5 CREAM (GRAM) TOPICAL AT BEDTIME
Refills: 0 | Status: DISCONTINUED | OUTPATIENT
Start: 2022-10-26 | End: 2022-10-29

## 2022-10-26 RX ORDER — OMALIZUMAB 150 MG/ML
2 INJECTION, SOLUTION SUBCUTANEOUS
Qty: 0 | Refills: 0 | DISCHARGE

## 2022-10-26 RX ORDER — SODIUM CHLORIDE 9 MG/ML
1000 INJECTION INTRAMUSCULAR; INTRAVENOUS; SUBCUTANEOUS ONCE
Refills: 0 | Status: COMPLETED | OUTPATIENT
Start: 2022-10-26 | End: 2022-10-26

## 2022-10-26 RX ORDER — SEMAGLUTIDE 0.68 MG/ML
1 INJECTION, SOLUTION SUBCUTANEOUS
Qty: 0 | Refills: 0 | DISCHARGE

## 2022-10-26 RX ORDER — SIMVASTATIN 20 MG/1
1 TABLET, FILM COATED ORAL
Qty: 0 | Refills: 0 | DISCHARGE

## 2022-10-26 RX ADMIN — SODIUM CHLORIDE 1000 MILLILITER(S): 9 INJECTION INTRAMUSCULAR; INTRAVENOUS; SUBCUTANEOUS at 15:22

## 2022-10-26 RX ADMIN — PIPERACILLIN AND TAZOBACTAM 25 GRAM(S): 4; .5 INJECTION, POWDER, LYOPHILIZED, FOR SOLUTION INTRAVENOUS at 21:22

## 2022-10-26 RX ADMIN — PIPERACILLIN AND TAZOBACTAM 3.38 GRAM(S): 4; .5 INJECTION, POWDER, LYOPHILIZED, FOR SOLUTION INTRAVENOUS at 15:52

## 2022-10-26 RX ADMIN — SODIUM CHLORIDE 50 MILLILITER(S): 9 INJECTION, SOLUTION INTRAVENOUS at 21:17

## 2022-10-26 RX ADMIN — PIPERACILLIN AND TAZOBACTAM 200 GRAM(S): 4; .5 INJECTION, POWDER, LYOPHILIZED, FOR SOLUTION INTRAVENOUS at 15:22

## 2022-10-26 NOTE — CONSULT NOTE ADULT - PROBLEM SELECTOR RECOMMENDATION 7
Chronic  - On febuxostat at home, not on formulary Chronic  - On febuxostat at home, not on formulary  -may resume on discharge

## 2022-10-26 NOTE — CONSULT NOTE ADULT - PROBLEM SELECTOR RECOMMENDATION 9
Patient with history of diverticulitis, recently admitted to [facility] from 10/13-10/18 and discharged on Vantin and Flagyl, now with diarrhea and abdominal cramping  - Admit to Surgery  - Does not meet sepsis criteria on admission  - Received Zosyn in the ED, continue inpatient per ID  - With recent antibiotic use, concern for C diff, check PCR  - Ongoing diarrhea and abdominal cramps could also be reflection of ongoing diverticular disease   - Rest of management per surgery Patient with history of diverticulitis, recently admitted to [facility] from 10/13-10/18 and discharged on Vantin and Flagyl, now with diarrhea and abdominal cramping  - Admit to Surgery  - Does not meet sepsis criteria on admission  - Received Zosyn in the ED, continue inpatient per ID  - With recent antibiotic use, concern for C diff, check C diff PCR  - F/u GI PCR  - Ongoing diarrhea and abdominal cramps could also be reflection of ongoing diverticular disease   - Rest of management per surgery

## 2022-10-26 NOTE — H&P ADULT - ASSESSMENT
This is a 47y year old Male with PMHx of HTN, HLD, T2DM, asthma, VIOLETA, gout, Hodgkin's lymphoma, denies abdominal surgical history presenting with diarrhea and poor PO intake s/p d/c for diverticulitis likely 2/2 PO abx. This is a 47y year old Male with PMHx of HTN, HLD, T2DM, asthma, VIOLETA, gout, Hodgkin's lymphoma, denies abdominal surgical history presenting with diarrhea and poor PO intake s/p d/c for diverticulitis likely 2/2 PO abx.  As in above PA notation.  Mr. Whyte personally seen and examined in ER during PM rounds.  Mild LLQ pain.  Fatigue, anorexia and generalized malaise persist.  Reporting very poor PO intake due to same "the thought of drinking in not pleasant..."  However, after three voluminous stools this am, no further diarrhea since arrival.  Vitals non-suggestive.  Abdomen soft with mild tenderness to deep palpation of LLQ.  Labs reassuring overall, though stool GI PCR and C. Diff pending.  Outpatient CT concerning for persistent diverticulitis.  Clinically, regression since discharge on PO ABX.  Surgically, stable for present.  To continue current supportive care with appreciation of ID consult and Hospitalist assistance.  Reviewed with patient in detail, GI attending, Surgical PA and today's RN team.

## 2022-10-26 NOTE — CONSULT NOTE ADULT - PROBLEM SELECTOR RECOMMENDATION 3
Chronic  - Patient uses CPAP at night  - Settings 15/12 Chronic  - Patient uses BiPAP at night  - will continue BiPAP

## 2022-10-26 NOTE — H&P ADULT - HISTORY OF PRESENT ILLNESS
This is a 47y year old Male with PMHx of HTN, HLD, T2DM, asthma, VIOLETA, gout, Hodgkin's lymphoma, denies abdominal surgical history presenting with complaint of diarrhea for past few days. Pt was recently hospitalized for diverticulitis from 10/13-10/18 which responded to IV abx. Pt was d/c'd on vantin/flagyl and has had complaint of diarrhea and poor PO intake. Pt states he has felt overall "unwell since discharge."  First episode of diverticulitis. Pt denies history of colonoscopy. Denies history of chest pain, shortness of breath, urinary complaints.

## 2022-10-26 NOTE — CONSULT NOTE ADULT - PROBLEM SELECTOR RECOMMENDATION 5
Chronic  - /80  - Continue home Losartan 50mg daily inpatient with hold parameters  - Monitor routine hemodynamics

## 2022-10-26 NOTE — CONSULT NOTE ADULT - PROBLEM SELECTOR RECOMMENDATION 2
Chronic  - A1c 5.6 on recent admission  - On Metformin 1000mg daily  - Hold oral hypoglycemics inpatient  - Start low dose ISS  - Accuchecks, hypoglycemia protocol Chronic  - A1c 5.6 on recent admission  - On Metformin 1000mg daily and Wegovy at home  - Hold oral hypoglycemics inpatient  - Start low dose ISS  - Accuchecks, hypoglycemia protocol

## 2022-10-26 NOTE — H&P ADULT - PROBLEM SELECTOR PLAN 1
-Discussed with Dr. Scott  -Isolation for c-diff r/o  -zosyn IV abx  -No acute plan for OR  -CLD, gentle IVF, supportive care  -medicine and ID consulted  -labs and vitals reassuring    Surgical Team Contact Information  Spectralink: Ext: [phone] or [phone]  Pager: [phone]

## 2022-10-26 NOTE — CONSULT NOTE ADULT - SUBJECTIVE AND OBJECTIVE BOX
Patient is a 47y old  Male who presents with a chief complaint of abdominal pain.   HPI:  46 yo male with PMH T2DM, HTN, HLD, Asthma, Sleep apnea, Gout, Hodgkin's lymphoma who presents to the ED with abdominal cramps and diarrhea. Patient was diagnosed with acute sigmoid diverticulitis two weeks prior, admitted to [facility] from 10/13-10/18/22 for management. Patient was treated with Zosyn, did not require surgical management. He was transitioned to Vantin and Flagyl on discharge, was compliant with andtibiotics. Patient followed up with surgeon Dr. Scott today, with multiple episodes of water diarrhea and was recommended to come to the ED for further work up.    In the ED:  Vitals: Temp 97.2F | HR 88 | /80 | RR 18 | SpO2 97% on RA  Labs: unremarkable  Outpatient CT scan from 10/25 showing interval improvement in sigmoid diverticulitis   CXR: elevated right hemidiaphragm  ECG:   Received: Zosyn, 1L NS bolus, Zofran    I&O's Summary      PAST MEDICAL & SURGICAL HISTORY:  Asthma      Diabetes mellitus      Gout      Lymphoma, Hodgkin&#x27;s      VIOLETA (obstructive sleep apnea)      Mediastinal tumor  removal          Allergies    No Known Allergies    Intolerances        SOCIAL HISTORY  Alcohol:  Tobacco:  Illicit substance use: denies    FAMILY HISTORY:      Home Medications:  Breo Ellipta 200 mcg-25 mcg/inh inhalation powder: 1 puff(s) inhaled once a day (13 Oct 2022 14:03)  losartan 50 mg oral tablet: 1 tab(s) orally once a day (13 Oct 2022 14:03)  metFORMIN 1000 mg oral tablet, extended release: 1 tab(s) orally once a day (13 Oct 2022 13:55)  Multi Vitamin+: 1 tab(s) orally once a day (13 Oct 2022 14:03)  simvastatin 20 mg oral tablet: 1 tab(s) orally once a day (at bedtime) (13 Oct 2022 14:03)  Singulair 10 mg oral tablet: 1 tab(s) orally once a day (13 Oct 2022 13:55)  Spiriva Respimat: 2.5 microgram(s) inhaled 2 puffs once a day (13 Oct 2022 14:03)  Uloric 40 mg oral tablet: 1 tab(s) orally once a day (13 Oct 2022 13:56)        LABS:                        14.1   8.58  )-----------( 264      ( 26 Oct 2022 15:00 )             42.6     10-26    140  |  106  |  15  ----------------------------<  89  3.9   |  28  |  0.80    Ca    9.3      26 Oct 2022 15:00  Mg     1.9     10-26    TPro  8.0  /  Alb  4.2  /  TBili  0.5  /  DBili  x   /  AST  38<H>  /  ALT  69  /  AlkPhos  55  10-26    PT/INR - ( 26 Oct 2022 15:00 )   PT: 13.9 sec;   INR: 1.19 ratio         PTT - ( 26 Oct 2022 15:00 )  PTT:39.1 sec    CAPILLARY BLOOD GLUCOSE                MEDICATIONS  (STANDING):  ondansetron Injectable 4 milliGRAM(s) IV Push once  piperacillin/tazobactam IVPB.. 3.375 Gram(s) IV Intermittent Once    MEDICATIONS  (PRN):      REVIEW OF SYSTEMS:  CONSTITUTIONAL: denies fever, chills, fatigue, weakness  HEENT: denies blurred vision, sore throat  SKIN: denies new lesions, rash  CARDIOVASCULAR: denies chest pain, chest pressure, palpitations  RESPIRATORY: denies shortness of breath, sputum production  GASTROINTESTINAL: admits abdominal cramps, loose stools. denies nausea, vomiting  GENITOURINARY: denies dysuria, discharge  NEUROLOGICAL: denies numbness, headache, focal weakness  MUSCULOSKELETAL: denies new joint pain, muscle aches  HEMATOLOGIC: denies gross bleeding, bruising  LYMPHATICS: denies enlarged lymph nodes, extremity swelling  PSYCHIATRIC: denies recent changes in anxiety, depression  ENDOCRINOLOGIC: denies sweating, cold or heat intolerance    RADIOLOGY & ADDITIONAL TESTS:    EKG:    Imaging Personally Reviewed:  [x] YES  [ ] NO    Consultant(s) Notes Reviewed:  [x] YES  [ ] NO        PHYSICAL EXAM:  T(F): 97.2 (10-26-22 @ 14:09), Max: 97.2 (10-26-22 @ 14:09)  HR: 88 (10-26-22 @ 14:09) (88 - 88)  BP: 119/80 (10-26-22 @ 14:09) (119/80 - 119/80)  RR: 18 (10-26-22 @ 14:09) (18 - 18)  SpO2: 97% (10-26-22 @ 14:09) (97% - 97%)    GENERAL: NAD, well-groomed, well-developed  HEAD:  Atraumatic, Normocephalic  EYES: EOMI, PERRL, conjunctiva and sclera clear  ENMT: No tonsillar erythema, exudates, or enlargement; Moist mucous membranes  NECK: Supple, No JVD, Normal thyroid  NERVOUS SYSTEM:  Alert & Oriented X3, Moves all extremities, Sensation to light touch intact  CHEST/LUNG: Clear to auscultation bilaterally; No wheezes, rales or rhonchi  HEART: RRR, S1, S2; No murmurs, rubs, or gallops  ABDOMEN: Soft, Nontender, Nondistended; Bowel sounds present  EXTREMITIES:  2+ Peripheral Pulses, No clubbing, cyanosis, or edema  LYMPH: No lymphadenopathy noted  SKIN: No rashes or lesions    Care Discussed with Consultants/Other Providers [x] YES  [ ] NO Patient is a 47y old  Male who presents with a chief complaint of abdominal pain.   HPI:  46 yo male with PMH T2DM, HTN, HLD, Asthma, Sleep apnea, Gout, non-Hodgkin's lymphoma who presents to the ED with abdominal cramps and diarrhea. Patient was diagnosed with acute sigmoid diverticulitis two weeks prior, admitted to [facility] from 10/13-10/18/22 for management. Patient was treated with Zosyn, did not require surgical management. He was transitioned to Vantin and Flagyl on discharge, was compliant with antibiotics, and finished the course on 10/24. He states that initially when he was discharged from [place] he felt improved, but for the past week he has had worsening watery diarrhea. Patient followed up with surgeon Dr. Scott today, with multiple episodes of water diarrhea and was recommended to come to the ED for further work up. He complains of having non-bloody watery diarrhea 3-5 times per day, with abdominal pain, nausea, decreased appetite, and dizziness. Denies fever, chills, chest pain, palpitations, vomiting, SOB, cough, rash, dysuria, hematuria, cold sx.  Of note: He had non-hodgkin's lymphoma in 1998, with a mediastinal open chest surgery (which paralyzed his right diaphragm causing elevated right lung) and 6 months of chemotherapy. While taking chemo he had several episodes of SVT, and had ablation.      In the ED:  Vitals: Temp 97.2F | HR 88 | /80 | RR 18 | SpO2 97% on RA  Labs: unremarkable  Outpatient CT scan from 10/25 showing interval improvement in sigmoid diverticulitis   CXR: elevated right hemidiaphragm  ECG:   Received: Zosyn, 1L NS bolus, Zofran    I&O's Summary      PAST MEDICAL & SURGICAL HISTORY:  Asthma      Diabetes mellitus      Gout      Lymphoma, Hodgkin&#x27;s      VIOLETA (obstructive sleep apnea)      Mediastinal tumor  removal          Allergies    No Known Allergies    Intolerances        SOCIAL HISTORY  Alcohol: 1 drink per month  Tobacco: never  Illicit substance use: denies    FAMILY HISTORY: mother- breast cancer. Father- asthma, macular degeneration      Home Medications:  Breo Ellipta 200 mcg-25 mcg/inh inhalation powder: 1 puff(s) inhaled once a day (13 Oct 2022 14:03)  losartan 50 mg oral tablet: 1 tab(s) orally once a day (13 Oct 2022 14:03)  metFORMIN 1000 mg oral tablet, extended release: 1 tab(s) orally once a day (13 Oct 2022 13:55)  Multi Vitamin+: 1 tab(s) orally once a day (13 Oct 2022 14:03)  simvastatin 20 mg oral tablet: 1 tab(s) orally once a day (at bedtime) (13 Oct 2022 14:03)  Singulair 10 mg oral tablet: 1 tab(s) orally once a day (13 Oct 2022 13:55)  Spiriva Respimat: 2.5 microgram(s) inhaled 2 puffs once a day (13 Oct 2022 14:03)  Uloric 40 mg oral tablet: 1 tab(s) orally once a day (13 Oct 2022 13:56)  Wegovy  Xolair injections 300 mg q2 weeks    LABS:                        14.1   8.58  )-----------( 264      ( 26 Oct 2022 15:00 )             42.6     10-26    140  |  106  |  15  ----------------------------<  89  3.9   |  28  |  0.80    Ca    9.3      26 Oct 2022 15:00  Mg     1.9     10-26    TPro  8.0  /  Alb  4.2  /  TBili  0.5  /  DBili  x   /  AST  38<H>  /  ALT  69  /  AlkPhos  55  10-26    PT/INR - ( 26 Oct 2022 15:00 )   PT: 13.9 sec;   INR: 1.19 ratio         PTT - ( 26 Oct 2022 15:00 )  PTT:39.1 sec    CAPILLARY BLOOD GLUCOSE          MEDICATIONS  (STANDING):  ondansetron Injectable 4 milliGRAM(s) IV Push once  piperacillin/tazobactam IVPB.. 3.375 Gram(s) IV Intermittent Once    MEDICATIONS  (PRN):      REVIEW OF SYSTEMS:  CONSTITUTIONAL: + weakness, + fatigue denies fever, chills  HEENT: denies blurred vision, sore throat  SKIN: denies new lesions, rash  CARDIOVASCULAR: denies chest pain, chest pressure, palpitations  RESPIRATORY: denies shortness of breath, sputum production  GASTROINTESTINAL: admits abdominal cramps, loose stools, nausea. denies vomiting  GENITOURINARY: denies dysuria, discharge  NEUROLOGICAL: denies numbness, headache, focal weakness  MUSCULOSKELETAL: denies new joint pain, muscle aches  HEMATOLOGIC: denies gross bleeding, bruising  LYMPHATICS: denies enlarged lymph nodes, extremity swelling  PSYCHIATRIC: denies recent changes in anxiety, depression  ENDOCRINOLOGIC: denies sweating, cold or heat intolerance    RADIOLOGY & ADDITIONAL TESTS:    EKG:    Imaging Personally Reviewed:  [x] YES  [ ] NO    Consultant(s) Notes Reviewed:  [x] YES  [ ] NO        PHYSICAL EXAM:  T(F): 97.2 (10-26-22 @ 14:09), Max: 97.2 (10-26-22 @ 14:09)  HR: 88 (10-26-22 @ 14:09) (88 - 88)  BP: 119/80 (10-26-22 @ 14:09) (119/80 - 119/80)  RR: 18 (10-26-22 @ 14:09) (18 - 18)  SpO2: 97% (10-26-22 @ 14:09) (97% - 97%)    GENERAL: NAD, well-groomed, well-developed  HEAD:  Atraumatic, Normocephalic  EYES: EOMI, PERRL, conjunctiva and sclera clear  ENMT: Moist mucous membranes  NECK: Supple, No JVD  NERVOUS SYSTEM:  Alert & Oriented X3, Moves all extremities, Sensation to light touch intact  CHEST/LUNG: Clear to auscultation bilaterally; No wheezes, rales or rhonchi  HEART: RRR, S1, S2; No murmurs, rubs, or gallops  ABDOMEN: Soft, tender to deep palpation in epigastric region and LLQ, Nondistended; Bowel sounds present  EXTREMITIES:  2+ Peripheral Pulses, No clubbing, cyanosis, or edema  LYMPH: No lymphadenopathy noted  SKIN: No rashes or lesions    Care Discussed with Consultants/Other Providers [x] YES  [ ] NO      Outpatient Providers:  PCP: Dr. Neno Amaro  Pulmonology: Dr. Sunny Mcfarlane  Endocrine: Dr. Wright in [place]  Cardio: Dr. Rivera in the [place] [facility] Patient is a 47y old  Male who presents with a chief complaint of abdominal pain.   HPI:  48 yo male with PMH T2DM, HTN, HLD, Asthma, Sleep apnea on BiPAP, Gout, non-Hodgkin's lymphoma who presents to the ED with abdominal cramps and diarrhea. Patient was diagnosed with acute sigmoid diverticulitis two weeks prior, admitted to [facility] from 10/13-10/18/22 for management. Patient was treated with Zosyn, did not require surgical management. He was transitioned to Vantin and Flagyl on discharge, was compliant with antibiotics, and finished the course on 10/24. He states that initially when he was discharged from [place] he felt improved, but for the past week he has had worsening watery diarrhea. Patient followed up with surgeon Dr. Scott today, with multiple episodes of water diarrhea and was recommended to come to the ED for further work up. He complains of having non-bloody watery diarrhea 3-5 times per day, with abdominal pain, nausea, decreased appetite, and dizziness. Denies fever, chills, chest pain, palpitations, vomiting, SOB, cough, rash, dysuria, hematuria, cold sx.  Of note: He had non-hodgkin's lymphoma in 1998, with a mediastinal open chest surgery (which paralyzed his right diaphragm causing elevated right lung) and 6 months of chemotherapy. While taking chemo he had several episodes of SVT, and had ablation.      In the ED:  Vitals: Temp 97.2F | HR 88 | /80 | RR 18 | SpO2 97% on RA  Labs: unremarkable  Outpatient CT scan from 10/25 showing interval improvement in sigmoid diverticulitis   CXR: elevated right hemidiaphragm  ECG:   Received: Zosyn, 1L NS bolus, Zofran    I&O's Summary      PAST MEDICAL & SURGICAL HISTORY:  Asthma      Diabetes mellitus      Gout      Lymphoma, Hodgkin&#x27;s      VIOLETA (obstructive sleep apnea)      Mediastinal tumor  removal          Allergies    No Known Allergies    Intolerances        SOCIAL HISTORY  Alcohol: 1 drink per month  Tobacco: never  Illicit substance use: denies    FAMILY HISTORY: mother- breast cancer. Father- asthma, macular degeneration      Home Medications:  Breo Ellipta 200 mcg-25 mcg/inh inhalation powder: 1 puff(s) inhaled once a day (13 Oct 2022 14:03)  losartan 50 mg oral tablet: 1 tab(s) orally once a day (13 Oct 2022 14:03)  metFORMIN 1000 mg oral tablet, extended release: 1 tab(s) orally once a day (13 Oct 2022 13:55)  Multi Vitamin+: 1 tab(s) orally once a day (13 Oct 2022 14:03)  simvastatin 20 mg oral tablet: 1 tab(s) orally once a day (at bedtime) (13 Oct 2022 14:03)  Singulair 10 mg oral tablet: 1 tab(s) orally once a day (13 Oct 2022 13:55)  Spiriva Respimat: 2.5 microgram(s) inhaled 2 puffs once a day (13 Oct 2022 14:03)  Uloric 40 mg oral tablet: 1 tab(s) orally once a day (13 Oct 2022 13:56)  Wegovy  Xolair injections 300 mg q2 weeks    LABS:                        14.1   8.58  )-----------( 264      ( 26 Oct 2022 15:00 )             42.6     10-26    140  |  106  |  15  ----------------------------<  89  3.9   |  28  |  0.80    Ca    9.3      26 Oct 2022 15:00  Mg     1.9     10-26    TPro  8.0  /  Alb  4.2  /  TBili  0.5  /  DBili  x   /  AST  38<H>  /  ALT  69  /  AlkPhos  55  10-26    PT/INR - ( 26 Oct 2022 15:00 )   PT: 13.9 sec;   INR: 1.19 ratio         PTT - ( 26 Oct 2022 15:00 )  PTT:39.1 sec    CAPILLARY BLOOD GLUCOSE          MEDICATIONS  (STANDING):  ondansetron Injectable 4 milliGRAM(s) IV Push once  piperacillin/tazobactam IVPB.. 3.375 Gram(s) IV Intermittent Once    MEDICATIONS  (PRN):      REVIEW OF SYSTEMS:  CONSTITUTIONAL: + weakness, + fatigue denies fever, chills  HEENT: denies blurred vision, sore throat  SKIN: denies new lesions, rash  CARDIOVASCULAR: denies chest pain, chest pressure, palpitations  RESPIRATORY: denies shortness of breath, sputum production  GASTROINTESTINAL: admits abdominal cramps, loose stools, nausea. denies vomiting  GENITOURINARY: denies dysuria, discharge  NEUROLOGICAL: denies numbness, headache, focal weakness  MUSCULOSKELETAL: denies new joint pain, muscle aches  HEMATOLOGIC: denies gross bleeding, bruising  LYMPHATICS: denies enlarged lymph nodes, extremity swelling  PSYCHIATRIC: denies recent changes in anxiety, depression  ENDOCRINOLOGIC: denies sweating, cold or heat intolerance    RADIOLOGY & ADDITIONAL TESTS:    EKG:    Imaging Personally Reviewed:  [x] YES  [ ] NO    Consultant(s) Notes Reviewed:  [x] YES  [ ] NO        PHYSICAL EXAM:  T(F): 97.2 (10-26-22 @ 14:09), Max: 97.2 (10-26-22 @ 14:09)  HR: 88 (10-26-22 @ 14:09) (88 - 88)  BP: 119/80 (10-26-22 @ 14:09) (119/80 - 119/80)  RR: 18 (10-26-22 @ 14:09) (18 - 18)  SpO2: 97% (10-26-22 @ 14:09) (97% - 97%)    GENERAL: NAD, well-groomed, well-developed  HEAD:  Atraumatic, Normocephalic  EYES: EOMI, PERRL, conjunctiva and sclera clear  ENMT: Moist mucous membranes  NECK: Supple, No JVD  NERVOUS SYSTEM:  Alert & Oriented X3, Moves all extremities, Sensation to light touch intact  CHEST/LUNG: Clear to auscultation bilaterally; No wheezes, rales or rhonchi  HEART: RRR, S1, S2; No murmurs, rubs, or gallops  ABDOMEN: Soft, tender to deep palpation in epigastric region and LLQ, Nondistended; Bowel sounds present  EXTREMITIES:  2+ Peripheral Pulses, No clubbing, cyanosis, or edema  LYMPH: No lymphadenopathy noted  SKIN: No rashes or lesions    Care Discussed with Consultants/Other Providers [x] YES  [ ] NO      Outpatient Providers:  PCP: Dr. Neno Amaro  Pulmonology: Dr. Sunny Mcfarlane  Endocrine: Dr. Wright in [place]  Cardio: Dr. Rivera in the [facility]

## 2022-10-26 NOTE — ED PROVIDER NOTE - CLINICAL SUMMARY MEDICAL DECISION MAKING FREE TEXT BOX
47-year-old white male with left lower quadrant pain and diarrhea diagnosed with diverticulitis 10 days ago for which she was hospitalized for receiving IV antibiotics ultimately presenting back to our emergency department many days post discharge complaining of frequent watery diarrhea nonbloody with cramps.  No fever no chills.  No blood in stool.  This case will require extensive evaluation as well as labs imaging IV fluids and medications.

## 2022-10-26 NOTE — ED PROVIDER NOTE - ATTENDING APP SHARED VISIT CONTRIBUTION OF CARE
Examination reveals a well-developed well-nourished white male in no acute distress with a soft but minimally tender abdomen in the left lower quadrant to deep palpation there is no rebound no guarding.  Case will need to be reviewed again with Dr. Karsten Scott and I do agree with plan and management outlined by PA.

## 2022-10-26 NOTE — ED PROVIDER NOTE - GASTROINTESTINAL, MLM
Abdomen soft, non-tender, no guarding. no abd tenderness, no rebound or guarding. no cva tenderness.

## 2022-10-26 NOTE — H&P ADULT - NSICDXPASTMEDICALHX_GEN_ALL_CORE_FT
PAST MEDICAL HISTORY:  Asthma     Diabetes mellitus     Gout     HLD (hyperlipidemia)     HTN (hypertension)     Lymphoma, Hodgkin's     VIOLETA (obstructive sleep apnea)

## 2022-10-26 NOTE — H&P ADULT - NSHPLABSRESULTS_GEN_ALL_CORE
Data:  T(C): 36.2 (10-26-22 @ 14:09), Max: 36.2 (10-26-22 @ 14:09)  HR: 88 (10-26-22 @ 14:09) (88 - 88)  BP: 119/80 (10-26-22 @ 14:09) (119/80 - 119/80)  RR: 18 (10-26-22 @ 14:09) (18 - 18)  SpO2: 97% (10-26-22 @ 14:09) (97% - 97%)                        14.1   8.58  )-----------( 264      ( 26 Oct 2022 15:00 )             42.6     10-26    140  |  106  |  15  ----------------------------<  89  3.9   |  28  |  0.80    Ca    9.3      26 Oct 2022 15:00  Mg     1.9     10-26    TPro  8.0  /  Alb  4.2  /  TBili  0.5  /  DBili  x   /  AST  38<H>  /  ALT  69  /  AlkPhos  55  10-26      LIVER FUNCTIONS - ( 26 Oct 2022 15:00 )  Alb: 4.2 g/dL / Pro: 8.0 g/dL / ALK PHOS: 55 U/L / ALT: 69 U/L / AST: 38 U/L / GGT: x           Radiology:  < from: CT Abdomen and Pelvis w/ Oral Cont and w/ IV Cont (10.25.22 @ 16:58) >    FINDINGS:  LOWER CHEST: Poststernotomy changes. Elevated right hemidiaphragm.    LIVER: Within normal limits.  BILE DUCTS: Normal caliber.  GALLBLADDER: Cholelithiasis.  SPLEEN: Within normal limits.  PANCREAS: Within normal limits.  ADRENALS: Within normal limits.  KIDNEYS/URETERS: Bilateral cysts.    BLADDER: Only minimally distended and not well evaluated.  REPRODUCTIVE ORGANS: Prostate within normal limits.    BOWEL: No bowel obstruction. Appendix is normal.Small periampullary   duodenal diverticulum, containing contrast. Persistent changes of sigmoid   diverticulitis with slight interval improvement.  PERITONEUM: No ascites.  VESSELS: Within normal limits.  RETROPERITONEUM/LYMPH NODES: No lymphadenopathy.  ABDOMINAL WALL: Within normal limits.  BONES: Degenerative changes. Stable fracture of L1 vertebral body.    IMPRESSION:  Slight interval improvement in sigmoid diverticulitis. No new abnormality.    --- End of Report ---    JENNIFER NORTON MD; Attending Radiologist   This document has been electronically signed. Oct 25 2022  5:11PM    < end of copied text >

## 2022-10-26 NOTE — CONSULT NOTE ADULT - SUBJECTIVE AND OBJECTIVE BOX
[facility]  INFECTIOUS DISEASES   [address] [place] [address]  Tel: [phone]     Fax: [phone]  ======================================================  MD Amol Palomares Kaushal, MD Cho, Michelle, MD   ======================================================    [id number]  GENOVEVA ANDRES     CC: Patient is a 47y old  Male who presents with a chief complaint of   HPI:      PAST MEDICAL & SURGICAL HISTORY:  Asthma  Diabetes mellitus  Gout  Lymphoma, Hodgkin&#x27;s  VIOLETA (obstructive sleep apnea)  Mediastinal tumor  removal    Social Hx: no smoking, ETOH or drugs     FAMILY HISTORY: Noncontributory     Allergies  No Known Allergies    Antibiotics:  MEDICATIONS  (STANDING):  ondansetron Injectable 4 milliGRAM(s) IV Push once  Zosyn     REVIEW OF SYSTEMS:  CONSTITUTIONAL:  No Fever or chills  HEENT:  No diplopia or blurred vision.  No sore throat or runny nose.  CARDIOVASCULAR:  No chest pain or SOB.  RESPIRATORY:  No cough, shortness of breath, PND or orthopnea.  GASTROINTESTINAL:  No nausea, vomiting but has diarrhea. + abdominal pain  GENITOURINARY:  No dysuria, frequency or urgency. No Blood in urine  MUSCULOSKELETAL:  no joint aches, no muscle pain  SKIN:  No change in skin, hair or nails.  NEUROLOGIC:  No paresthesias, fasciculations, seizures or weakness.  PSYCHIATRIC:  No disorder of thought or mood.  ENDOCRINE:  No heat or cold intolerance, polyuria or polydipsia.  HEMATOLOGICAL:  No easy bruising or bleeding.     Physical Exam:  Vital Signs Last 24 Hrs  T(C): 36.2 (26 Oct 2022 14:09), Max: 36.2 (26 Oct 2022 14:09)  T(F): 97.2 (26 Oct 2022 14:09), Max: 97.2 (26 Oct 2022 14:09)  HR: 88 (26 Oct 2022 14:09) (88 - 88)  BP: 119/80 (26 Oct 2022 14:09) (119/80 - 119/80)  BP(mean): --  RR: 18 (26 Oct 2022 14:09) (18 - 18)  SpO2: 97% (26 Oct 2022 14:09) (97% - 97%)  Parameters below as of 26 Oct 2022 14:09  Patient On (Oxygen Delivery Method): room air  Height (cm): 172.7 (10-26 @ 14:09)  Weight (kg): 105.2 (10-26 @ 14:09)  BMI (kg/m2): 35.3 (10-26 @ 14:09)  BSA (m2): 2.18 (10-26 @ 14:09)  GEN: NAD  HEENT: normocephalic and atraumatic. EOMI. PERRL.    NECK: Supple.  No lymphadenopathy   LUNGS: Clear to auscultation.  HEART: Regular rate and rhythm without murmur.  ABDOMEN: Soft, nontender, and nondistended.  Positive bowel sounds.    : No CVA tenderness  EXTREMITIES: Without any cyanosis, clubbing, rash, lesions or edema.  NEUROLOGIC: grossly intact.  PSYCHIATRIC: Appropriate affect .  SKIN: No ulceration or rash     Labs:  10-26    140  |  106  |  15  ----------------------------<  89  3.9   |  28  |  0.80    Ca    9.3      26 Oct 2022 15:00  Mg     1.9     10-26    TPro  8.0  /  Alb  4.2  /  TBili  0.5  /  DBili  x   /  AST  38<H>  /  ALT  69  /  AlkPhos  55  10-26                        14.1   8.58  )-----------( 264      ( 26 Oct 2022 15:00 )             42.6     PT/INR - ( 26 Oct 2022 15:00 )   PT: 13.9 sec;   INR: 1.19 ratio    PTT - ( 26 Oct 2022 15:00 )  PTT:39.1 sec    LIVER FUNCTIONS - ( 26 Oct 2022 15:00 )  Alb: 4.2 g/dL / Pro: 8.0 g/dL / ALK PHOS: 55 U/L / ALT: 69 U/L / AST: 38 U/L / GGT: x           COVID-19 PCR: NotDetec (10-13-22 @ 14:46)    All imaging and other data have been reviewed.  < from: CT Abdomen and Pelvis w/ Oral Cont and w/ IV Cont (10.25.22 @ 16:58) >  EXAM: [id number] - CT ABDOMEN AND PELVIS OC IC  - ORDERED BY: JORJE LOZADA  PROCEDURE DATE:  10/25/2022    INTERPRETATION:  CLINICAL INFORMATION: Fatigue malaise, diarrhea. Acute   diverticulitis recently.  COMPARISON: 10/13/2022.  CONTRAST/COMPLICATIONS:  IV Contrast: Omnipaque 350  90 cc administered   10 cc discarded  Oral Contrast: Omnipaque 350  Complications: None reported at time of study completion  PROCEDURE:  CT of the Abdomen and Pelvis was performed.  Sagittal and coronal reformats were performed.  FINDINGS:  LOWER CHEST: Poststernotomy changes. Elevated right hemidiaphragm.  LIVER: Within normal limits.  BILE DUCTS: Normal caliber.  GALLBLADDER: Cholelithiasis.  SPLEEN: Within normal limits.  PANCREAS: Within normal limits.  ADRENALS: Within normal limits.  KIDNEYS/URETERS: Bilateral cysts.  BLADDER: Only minimally distended and not well evaluated.  REPRODUCTIVE ORGANS: Prostate within normal limits.  BOWEL: No bowel obstruction. Appendix is normal.Small periampullary   duodenal diverticulum, containing contrast. Persistent changes of sigmoid   diverticulitis with slight interval improvement.  PERITONEUM: No ascites.  VESSELS: Within normal limits.  RETROPERITONEUM/LYMPH NODES: No lymphadenopathy.  ABDOMINAL WALL: Within normal limits.  BONES: Degenerative changes. Stable fracture of L1 vertebral body.  IMPRESSION:  Slight interval improvement in sigmoid diverticulitis. No new abnormality.    Assessment and Plan:       Thank you for courtesy of this consult.     Will follow.  Discussed with the primary team.     Gabbi Vilchis MD  Division of Infectious Diseases   Please call ID service at [phone] with any question.      55 minutes spent on total encounter assessing patient, examination, chart review, counseling and coordinating care by the attending physician/nurse/care manager.   [facility]  INFECTIOUS DISEASES   [address] [place] [address]  Tel: [phone]     Fax: [phone]  ======================================================  MD Amol Palomares Kaushal, MD Cho, Michelle, MD   ======================================================    MRN-[id number]  GENOVEVA ANDRES     CC: Abdominal pain and diarrhea   HPI: 48yo man with PMH of DM, asthma, lymphoma in remission was admitted with severe diarrhea. He had diverticulitis admitted at  between 10/13 and 10/18, kept on zosyn with good response, no fever and   normalized WBC with clinical improvement so was switched to PO vantin and flagyl and sent home. Today he comes with severe diarrhea in last 2 days, repeat CT on 10/25 showed slightly improved diverticulitis but  still has persistent changes of sigmoid colon with diverticulitis. No fever or leukocytosis today. No nausea or vomiting.     PAST MEDICAL & SURGICAL HISTORY:  Asthma  Diabetes mellitus  Gout  Lymphoma, Hodgkin&#x27;s  VIOLETA (obstructive sleep apnea)  Mediastinal tumor  removal    Social Hx: no smoking, ETOH or drugs     FAMILY HISTORY: Noncontributory     Allergies  No Known Allergies    Antibiotics:  MEDICATIONS  (STANDING):  ondansetron Injectable 4 milliGRAM(s) IV Push once  Zosyn     REVIEW OF SYSTEMS:  CONSTITUTIONAL:  No Fever or chills  HEENT:  No diplopia or blurred vision.  No sore throat or runny nose.  CARDIOVASCULAR:  No chest pain or SOB.  RESPIRATORY:  No cough, shortness of breath, PND or orthopnea.  GASTROINTESTINAL:  No nausea, vomiting but has diarrhea. + abdominal pain  GENITOURINARY:  No dysuria, frequency or urgency. No Blood in urine  MUSCULOSKELETAL:  no joint aches, no muscle pain  SKIN:  No change in skin, hair or nails.  NEUROLOGIC:  No paresthesias, fasciculations, seizures or weakness.  PSYCHIATRIC:  No disorder of thought or mood.  ENDOCRINE:  No heat or cold intolerance, polyuria or polydipsia.  HEMATOLOGICAL:  No easy bruising or bleeding.     Physical Exam:  Vital Signs Last 24 Hrs  T(C): 36.2 (26 Oct 2022 14:09), Max: 36.2 (26 Oct 2022 14:09)  T(F): 97.2 (26 Oct 2022 14:09), Max: 97.2 (26 Oct 2022 14:09)  HR: 88 (26 Oct 2022 14:09) (88 - 88)  BP: 119/80 (26 Oct 2022 14:09) (119/80 - 119/80)  BP(mean): --  RR: 18 (26 Oct 2022 14:09) (18 - 18)  SpO2: 97% (26 Oct 2022 14:09) (97% - 97%)  Parameters below as of 26 Oct 2022 14:09  Patient On (Oxygen Delivery Method): room air  Height (cm): 172.7 (10-26 @ 14:09)  Weight (kg): 105.2 (10-26 @ 14:09)  BMI (kg/m2): 35.3 (10-26 @ 14:09)  BSA (m2): 2.18 (10-26 @ 14:09)  GEN: NAD  HEENT: normocephalic and atraumatic. EOMI. PERRL.    NECK: Supple.  No lymphadenopathy   LUNGS: Clear to auscultation.  HEART: Regular rate and rhythm without murmur.  ABDOMEN: Soft, and nondistended, mild pain in deep palpation of LLQ, Positive bowel sounds.    : No CVA tenderness  EXTREMITIES: Without any cyanosis, clubbing, rash, lesions or edema.  NEUROLOGIC: grossly intact.  PSYCHIATRIC: Appropriate affect .  SKIN: No ulceration or rash     Labs:  10-26    140  |  106  |  15  ----------------------------<  89  3.9   |  28  |  0.80    Ca    9.3      26 Oct 2022 15:00  Mg     1.9     10-26    TPro  8.0  /  Alb  4.2  /  TBili  0.5  /  DBili  x   /  AST  38<H>  /  ALT  69  /  AlkPhos  55  10-26                        14.1   8.58  )-----------( 264      ( 26 Oct 2022 15:00 )             42.6     PT/INR - ( 26 Oct 2022 15:00 )   PT: 13.9 sec;   INR: 1.19 ratio    PTT - ( 26 Oct 2022 15:00 )  PTT:39.1 sec    LIVER FUNCTIONS - ( 26 Oct 2022 15:00 )  Alb: 4.2 g/dL / Pro: 8.0 g/dL / ALK PHOS: 55 U/L / ALT: 69 U/L / AST: 38 U/L / GGT: x           COVID-19 PCR: NotDetec (10-13-22 @ 14:46)    All imaging and other data have been reviewed.  < from: CT Abdomen and Pelvis w/ Oral Cont and w/ IV Cont (10.25.22 @ 16:58) >  EXAM: [id number] - CT ABDOMEN AND PELVIS OC IC  - ORDERED BY: JORJE LOZADA  PROCEDURE DATE:  10/25/2022    INTERPRETATION:  CLINICAL INFORMATION: Fatigue malaise, diarrhea. Acute   diverticulitis recently.  COMPARISON: 10/13/2022.  CONTRAST/COMPLICATIONS:  IV Contrast: Omnipaque 350  90 cc administered   10 cc discarded  Oral Contrast: Omnipaque 350  Complications: None reported at time of study completion  PROCEDURE:  CT of the Abdomen and Pelvis was performed.  Sagittal and coronal reformats were performed.  FINDINGS:  LOWER CHEST: Poststernotomy changes. Elevated right hemidiaphragm.  LIVER: Within normal limits.  BILE DUCTS: Normal caliber.  GALLBLADDER: Cholelithiasis.  SPLEEN: Within normal limits.  PANCREAS: Within normal limits.  ADRENALS: Within normal limits.  KIDNEYS/URETERS: Bilateral cysts.  BLADDER: Only minimally distended and not well evaluated.  REPRODUCTIVE ORGANS: Prostate within normal limits.  BOWEL: No bowel obstruction. Appendix is normal.Small periampullary   duodenal diverticulum, containing contrast. Persistent changes of sigmoid   diverticulitis with slight interval improvement.  PERITONEUM: No ascites.  VESSELS: Within normal limits.  RETROPERITONEUM/LYMPH NODES: No lymphadenopathy.  ABDOMINAL WALL: Within normal limits.  BONES: Degenerative changes. Stable fracture of L1 vertebral body.  IMPRESSION:  Slight interval improvement in sigmoid diverticulitis. No new abnormality.    Assessment and Plan:   HPI: 48yo man with PMH of DM, asthma, lymphoma in remission was admitted with severe diarrhea. He had diverticulitis admitted at  between 10/13 and 10/18, kept on zosyn with good response, no fever and   normalized WBC with clinical improvement so was switched to PO vantin and flagyl and sent home. Today he comes with severe diarrhea in last 2 days, repeat CT on 10/25 showed slightly improved diverticulitis but  still has persistent changes of sigmoid colon with diverticulitis.   This could be c diff due to recent ABx use but also could be continued diverticular disease causing inflammation and diarrhea.   No leukocytosis today, no fever.     Recommendations:  - Will send GI PCR and C diff   - Will resume zosyn 3.375gm q8 for now   - Surgery follow up  - If c diff positive will start oral vancomycin 125mg q6    Thank you for courtesy of this consult.     Will follow.  Discussed with the primary team.     Gabbi Vilchis MD  Division of Infectious Diseases   Please call ID service at [phone] with any question.      55 minutes spent on total encounter assessing patient, examination, chart review, counseling and coordinating care by the attending physician/nurse/care manager.

## 2022-10-26 NOTE — H&P ADULT - NSHPPHYSICALEXAM_GEN_ALL_CORE
GENERAL: No acute distress, well-developed  HEAD:  Atraumatic, Normocephalic  ABDOMEN: Soft, diffuse mild tenderness, non-distended  NEUROLOGY: responding appropriately, no focal deficits

## 2022-10-26 NOTE — ED PROVIDER NOTE - OBJECTIVE STATEMENT
48 yo male with h/o dm, htn, hld, asthma, sleep apnea, gout, hodgkin's lymphoma presents to the ED c/o generalized weakness, nausea, diarrhea x 2 weeks. Patient was diagnosed with diverticulitis 2 weeks ago, admitted at [facility] at that time for 5 days, finished course of vantin and flagly. Patient followed up with Dr. Scott yesterday, had outpatient CT which still shows diverticulitis, no abscess or perforation. Patient with 3-5 episodes of watery diarrhea daily, non bloody.  Denies fever, chills, chest pain, sob, vomiting.     pmd: Dr. Yvette Bush

## 2022-10-26 NOTE — ED ADULT NURSE NOTE - NSIMPLEMENTINTERV_GEN_ALL_ED
Implemented All Universal Safety Interventions:  [place] to call system. Call bell, personal items and telephone within reach. Instruct patient to call for assistance. Room bathroom lighting operational. Non-slip footwear when patient is off stretcher. Physically safe environment: no spills, clutter or unnecessary equipment. Stretcher in lowest position, wheels locked, appropriate side rails in place.

## 2022-10-26 NOTE — CONSULT NOTE ADULT - ATTENDING COMMENTS
48 yo male with PMH T2DM, HTN, HLD, Asthma, Sleep apnea, Gout, Hodgkin's lymphoma who presents to the ED with abdominal cramps and diarrhea, admitted for management of diverticulitis.     HPI as above.     T(C): 36.2 (10-26-22 @ 14:09), Max: 36.2 (10-26-22 @ 14:09)  HR: 88 (10-26-22 @ 14:09) (88 - 88)  BP: 119/80 (10-26-22 @ 14:09) (119/80 - 119/80)  RR: 18 (10-26-22 @ 14:09) (18 - 18)  SpO2: 97% (10-26-22 @ 14:09) (97% - 97%)  Wt(kg): --    Physical Exam:   GENERAL: well-groomed, well-developed, NAD  HEENT: head NC/AT; conjunctiva & sclera clear; hearing grossly intact, moist mucous membranes  NECK: supple, no JVD  RESPIRATORY: CTA B/L, no wheezing, rales, rhonchi or rubs  CARDIOVASCULAR: S1&S2, RRR, no murmurs or gallops  ABDOMEN: soft, TTP of b/l lower quadrants, non-distended, + Bowel sounds x4 quadrants, no guarding, rebound or rigidity  MUSCULOSKELETAL:  no clubbing, cyanosis or edema of all 4 extremities  LYMPH: no cervical lymphadenopathy  VASCULAR: Radial pulses 2+ bilaterally, no varicose veins   SKIN: warm and dry, color normal  NEUROLOGIC: AA&O X3, CN2-12 grossly intact w/ no focal deficits, no sensory loss, moving all extremities  Psych: Normal mood and affect, normal behavior    Plan:  continue IV abx  -monitor for fever, trend WBC count  -continue BiPAP for his sleep apnea    dvt ppx: lovenox    remainder as above.

## 2022-10-27 DIAGNOSIS — C85.90 NON-HODGKIN LYMPHOMA, UNSPECIFIED, UNSPECIFIED SITE: ICD-10-CM

## 2022-10-27 LAB
ANION GAP SERPL CALC-SCNC: 5 MMOL/L — SIGNIFICANT CHANGE UP (ref 5–17)
BASOPHILS # BLD AUTO: 0.06 K/UL — SIGNIFICANT CHANGE UP (ref 0–0.2)
BASOPHILS NFR BLD AUTO: 0.7 % — SIGNIFICANT CHANGE UP (ref 0–2)
BUN SERPL-MCNC: 10 MG/DL — SIGNIFICANT CHANGE UP (ref 7–23)
CALCIUM SERPL-MCNC: 8.6 MG/DL — SIGNIFICANT CHANGE UP (ref 8.5–10.1)
CHLORIDE SERPL-SCNC: 108 MMOL/L — SIGNIFICANT CHANGE UP (ref 96–108)
CO2 SERPL-SCNC: 27 MMOL/L — SIGNIFICANT CHANGE UP (ref 22–31)
CREAT SERPL-MCNC: 0.66 MG/DL — SIGNIFICANT CHANGE UP (ref 0.5–1.3)
EGFR: 118 ML/MIN/1.73M2 — SIGNIFICANT CHANGE UP
EOSINOPHIL # BLD AUTO: 0.18 K/UL — SIGNIFICANT CHANGE UP (ref 0–0.5)
EOSINOPHIL NFR BLD AUTO: 2.2 % — SIGNIFICANT CHANGE UP (ref 0–6)
GLUCOSE SERPL-MCNC: 95 MG/DL — SIGNIFICANT CHANGE UP (ref 70–99)
HCT VFR BLD CALC: 41.3 % — SIGNIFICANT CHANGE UP (ref 39–50)
HGB BLD-MCNC: 13.6 G/DL — SIGNIFICANT CHANGE UP (ref 13–17)
IMM GRANULOCYTES NFR BLD AUTO: 0.9 % — SIGNIFICANT CHANGE UP (ref 0–0.9)
LYMPHOCYTES # BLD AUTO: 1.98 K/UL — SIGNIFICANT CHANGE UP (ref 1–3.3)
LYMPHOCYTES # BLD AUTO: 24.1 % — SIGNIFICANT CHANGE UP (ref 13–44)
MAGNESIUM SERPL-MCNC: 1.9 MG/DL — SIGNIFICANT CHANGE UP (ref 1.6–2.6)
MCHC RBC-ENTMCNC: 27.8 PG — SIGNIFICANT CHANGE UP (ref 27–34)
MCHC RBC-ENTMCNC: 32.9 GM/DL — SIGNIFICANT CHANGE UP (ref 32–36)
MCV RBC AUTO: 84.3 FL — SIGNIFICANT CHANGE UP (ref 80–100)
MONOCYTES # BLD AUTO: 0.45 K/UL — SIGNIFICANT CHANGE UP (ref 0–0.9)
MONOCYTES NFR BLD AUTO: 5.5 % — SIGNIFICANT CHANGE UP (ref 2–14)
NEUTROPHILS # BLD AUTO: 5.47 K/UL — SIGNIFICANT CHANGE UP (ref 1.8–7.4)
NEUTROPHILS NFR BLD AUTO: 66.6 % — SIGNIFICANT CHANGE UP (ref 43–77)
NRBC # BLD: 0 /100 WBCS — SIGNIFICANT CHANGE UP (ref 0–0)
PHOSPHATE SERPL-MCNC: 2.9 MG/DL — SIGNIFICANT CHANGE UP (ref 2.5–4.5)
PLATELET # BLD AUTO: 260 K/UL — SIGNIFICANT CHANGE UP (ref 150–400)
POTASSIUM SERPL-MCNC: 4 MMOL/L — SIGNIFICANT CHANGE UP (ref 3.5–5.3)
POTASSIUM SERPL-SCNC: 4 MMOL/L — SIGNIFICANT CHANGE UP (ref 3.5–5.3)
RBC # BLD: 4.9 M/UL — SIGNIFICANT CHANGE UP (ref 4.2–5.8)
RBC # FLD: 12.8 % — SIGNIFICANT CHANGE UP (ref 10.3–14.5)
SODIUM SERPL-SCNC: 140 MMOL/L — SIGNIFICANT CHANGE UP (ref 135–145)
WBC # BLD: 8.21 K/UL — SIGNIFICANT CHANGE UP (ref 3.8–10.5)
WBC # FLD AUTO: 8.21 K/UL — SIGNIFICANT CHANGE UP (ref 3.8–10.5)

## 2022-10-27 PROCEDURE — 93010 ELECTROCARDIOGRAM REPORT: CPT

## 2022-10-27 PROCEDURE — 99233 SBSQ HOSP IP/OBS HIGH 50: CPT

## 2022-10-27 PROCEDURE — 99232 SBSQ HOSP IP/OBS MODERATE 35: CPT

## 2022-10-27 RX ORDER — PIPERACILLIN AND TAZOBACTAM 4; .5 G/20ML; G/20ML
3.38 INJECTION, POWDER, LYOPHILIZED, FOR SOLUTION INTRAVENOUS ONCE
Refills: 0 | Status: COMPLETED | OUTPATIENT
Start: 2022-10-27 | End: 2022-10-27

## 2022-10-27 RX ORDER — PIPERACILLIN AND TAZOBACTAM 4; .5 G/20ML; G/20ML
3.38 INJECTION, POWDER, LYOPHILIZED, FOR SOLUTION INTRAVENOUS EVERY 8 HOURS
Refills: 0 | Status: DISCONTINUED | OUTPATIENT
Start: 2022-10-27 | End: 2022-10-29

## 2022-10-27 RX ORDER — LACTOBACILLUS ACIDOPHILUS 100MM CELL
1 CAPSULE ORAL
Refills: 0 | Status: DISCONTINUED | OUTPATIENT
Start: 2022-10-27 | End: 2022-10-29

## 2022-10-27 RX ADMIN — BUDESONIDE AND FORMOTEROL FUMARATE DIHYDRATE 2 PUFF(S): 160; 4.5 AEROSOL RESPIRATORY (INHALATION) at 07:00

## 2022-10-27 RX ADMIN — SIMVASTATIN 20 MILLIGRAM(S): 20 TABLET, FILM COATED ORAL at 21:40

## 2022-10-27 RX ADMIN — ENOXAPARIN SODIUM 40 MILLIGRAM(S): 100 INJECTION SUBCUTANEOUS at 13:11

## 2022-10-27 RX ADMIN — Medication 1 TABLET(S): at 13:10

## 2022-10-27 RX ADMIN — LOSARTAN POTASSIUM 50 MILLIGRAM(S): 100 TABLET, FILM COATED ORAL at 06:51

## 2022-10-27 RX ADMIN — PIPERACILLIN AND TAZOBACTAM 200 GRAM(S): 4; .5 INJECTION, POWDER, LYOPHILIZED, FOR SOLUTION INTRAVENOUS at 13:10

## 2022-10-27 RX ADMIN — Medication 1000 MILLIGRAM(S): at 18:29

## 2022-10-27 RX ADMIN — Medication 1000 MILLIGRAM(S): at 13:11

## 2022-10-27 RX ADMIN — PIPERACILLIN AND TAZOBACTAM 25 GRAM(S): 4; .5 INJECTION, POWDER, LYOPHILIZED, FOR SOLUTION INTRAVENOUS at 18:25

## 2022-10-27 RX ADMIN — Medication 1000 MILLIGRAM(S): at 18:26

## 2022-10-27 RX ADMIN — PIPERACILLIN AND TAZOBACTAM 25 GRAM(S): 4; .5 INJECTION, POWDER, LYOPHILIZED, FOR SOLUTION INTRAVENOUS at 21:40

## 2022-10-27 RX ADMIN — PANTOPRAZOLE SODIUM 40 MILLIGRAM(S): 20 TABLET, DELAYED RELEASE ORAL at 13:18

## 2022-10-27 RX ADMIN — TIOTROPIUM BROMIDE 1 CAPSULE(S): 18 CAPSULE ORAL; RESPIRATORY (INHALATION) at 07:00

## 2022-10-27 RX ADMIN — Medication 1000 MILLIGRAM(S): at 13:29

## 2022-10-27 RX ADMIN — BUDESONIDE AND FORMOTEROL FUMARATE DIHYDRATE 2 PUFF(S): 160; 4.5 AEROSOL RESPIRATORY (INHALATION) at 00:22

## 2022-10-27 RX ADMIN — MONTELUKAST 10 MILLIGRAM(S): 4 TABLET, CHEWABLE ORAL at 13:10

## 2022-10-27 RX ADMIN — BUDESONIDE AND FORMOTEROL FUMARATE DIHYDRATE 2 PUFF(S): 160; 4.5 AEROSOL RESPIRATORY (INHALATION) at 21:40

## 2022-10-27 RX ADMIN — SODIUM CHLORIDE 50 MILLILITER(S): 9 INJECTION, SOLUTION INTRAVENOUS at 13:23

## 2022-10-27 RX ADMIN — Medication 1 TABLET(S): at 18:25

## 2022-10-27 NOTE — PROGRESS NOTE ADULT - SUBJECTIVE AND OBJECTIVE BOX
[facility]  INFECTIOUS DISEASES   [address] [place] [address]  Tel: [phone]     Fax: [phone]  ======================================================  MD Amol Palomares, MD Florentin Meier Michelle, MD   ======================================================    N-[id number]  GENOVEVA ANDRES     Follow up: Diverticulitis    No fever, no more diarrhea since admission.   Abdominal pain is improving, mild pain in LLQ.     PAST MEDICAL & SURGICAL HISTORY:  Asthma  Diabetes mellitus  Gout  Lymphoma, Hodgkin&#x27;s  VIOLETA (obstructive sleep apnea)  Mediastinal tumor  removal    Social Hx: no smoking, ETOH or drugs     FAMILY HISTORY: Noncontributory     Allergies  No Known Allergies    Antibiotics:  MEDICATIONS  (STANDING):  ondansetron Injectable 4 milliGRAM(s) IV Push once  Zosyn     REVIEW OF SYSTEMS:  CONSTITUTIONAL:  No Fever or chills  HEENT:  No diplopia or blurred vision.  No sore throat or runny nose.  CARDIOVASCULAR:  No chest pain or SOB.  RESPIRATORY:  No cough, shortness of breath, PND or orthopnea.  GASTROINTESTINAL:  No nausea, vomiting but has diarrhea. + abdominal pain  GENITOURINARY:  No dysuria, frequency or urgency. No Blood in urine  MUSCULOSKELETAL:  no joint aches, no muscle pain  SKIN:  No change in skin, hair or nails.  NEUROLOGIC:  No paresthesias, fasciculations, seizures or weakness.  PSYCHIATRIC:  No disorder of thought or mood.  ENDOCRINE:  No heat or cold intolerance, polyuria or polydipsia.  HEMATOLOGICAL:  No easy bruising or bleeding.     Physical Exam:  Vital Signs Last 24 Hrs  T(C): 36.6 (27 Oct 2022 11:01), Max: 36.7 (27 Oct 2022 00:00)  T(F): 97.8 (27 Oct 2022 11:01), Max: 98 (27 Oct 2022 00:00)  HR: 96 (27 Oct 2022 11:01) (91 - 104)  BP: 113/70 (27 Oct 2022 11:01) (105/73 - 122/60)  BP(mean): --  RR: 18 (27 Oct 2022 11:01) (17 - 18)  SpO2: 99% (27 Oct 2022 11:01) (96% - 99%)  Parameters below as of 27 Oct 2022 11:01  Patient On (Oxygen Delivery Method): room air  GEN: NAD  HEENT: normocephalic and atraumatic. EOMI. PERRL.    NECK: Supple.  No lymphadenopathy   LUNGS: Clear to auscultation.  HEART: Regular rate and rhythm without murmur.  ABDOMEN: Soft, and nondistended, mild pain in deep palpation of LLQ, Positive bowel sounds.    : No CVA tenderness  EXTREMITIES: Without any cyanosis, clubbing, rash, lesions or edema.  NEUROLOGIC: grossly intact.  PSYCHIATRIC: Appropriate affect .  SKIN: No ulceration or rash     Labs:                        13.6   8.21  )-----------( 260      ( 27 Oct 2022 07:16 )             41.3     10-27    140  |  108  |  10  ----------------------------<  95  4.0   |  27  |  0.66    Ca    8.6      27 Oct 2022 07:16  Phos  2.9     10-27  Mg     1.9     10-27    TPro  8.0  /  Alb  4.2  /  TBili  0.5  /  DBili  x   /  AST  38<H>  /  ALT  69  /  AlkPhos  55  10-26    WBC Count: 8.21 K/uL (10-27-22 @ 07:16)  WBC Count: 8.58 K/uL (10-26-22 @ 15:00)    Creatinine, Serum: 0.66 mg/dL (10-27-22 @ 07:16)  Creatinine, Serum: 0.80 mg/dL (10-26-22 @ 15:00)    COVID-19 PCR: NotDetec (10-26-22 @ 15:00)  COVID-19 PCR: NotDetec (10-13-22 @ 14:46)    All imaging and other data have been reviewed.  < from: CT Abdomen and Pelvis w/ Oral Cont and w/ IV Cont (10.25.22 @ 16:58) >  EXAM: [id number] - CT ABDOMEN AND PELVIS OC IC  - ORDERED BY: JORJE LOZADA  PROCEDURE DATE:  10/25/2022    INTERPRETATION:  CLINICAL INFORMATION: Fatigue malaise, diarrhea. Acute   diverticulitis recently.  COMPARISON: 10/13/2022.  CONTRAST/COMPLICATIONS:  IV Contrast: Omnipaque 350  90 cc administered   10 cc discarded  Oral Contrast: Omnipaque 350  Complications: None reported at time of study completion  PROCEDURE:  CT of the Abdomen and Pelvis was performed.  Sagittal and coronal reformats were performed.  FINDINGS:  LOWER CHEST: Poststernotomy changes. Elevated right hemidiaphragm.  LIVER: Within normal limits.  BILE DUCTS: Normal caliber.  GALLBLADDER: Cholelithiasis.  SPLEEN: Within normal limits.  PANCREAS: Within normal limits.  ADRENALS: Within normal limits.  KIDNEYS/URETERS: Bilateral cysts.  BLADDER: Only minimally distended and not well evaluated.  REPRODUCTIVE ORGANS: Prostate within normal limits.  BOWEL: No bowel obstruction. Appendix is normal.Small periampullary   duodenal diverticulum, containing contrast. Persistent changes of sigmoid   diverticulitis with slight interval improvement.  PERITONEUM: No ascites.  VESSELS: Within normal limits.  RETROPERITONEUM/LYMPH NODES: No lymphadenopathy.  ABDOMINAL WALL: Within normal limits.  BONES: Degenerative changes. Stable fracture of L1 vertebral body.  IMPRESSION:  Slight interval improvement in sigmoid diverticulitis. No new abnormality.    Assessment and Plan:   HPI: 48yo man with PMH of DM, asthma, lymphoma in remission was admitted with severe diarrhea. He had diverticulitis admitted at  between 10/13 and 10/18, kept on zosyn with good response, no fever and   normalized WBC with clinical improvement so was switched to PO vantin and flagyl and sent home. Today he comes with severe diarrhea in last 2 days, repeat CT on 10/25 showed slightly improved diverticulitis but  still has persistent changes of sigmoid colon with diverticulitis.   Had diarrhea but less likely c diff since he didn't have any BM since admission yesterday, most likely diverticular disease causing inflammation and diarrhea.   No leukocytosis, no fever.     Recommendations:  - Still can send for GI PCR and C diff   - Continue zosyn 3.375gm q8 for now   - Surgery/GI follow up noted     Will follow.    Gabbi Vilchis MD  Division of Infectious Diseases   Please call ID service at [phone] with any question.      35 minutes spent on total encounter assessing patient, examination, chart review, counseling and coordinating care by the attending physician/nurse/care manager.

## 2022-10-27 NOTE — PROGRESS NOTE ADULT - ASSESSMENT
The patient is a 47-year-old man with PMH of Type 2 DM, HTN, HLD, Asthma, Sleep apnea, Gout, Hodgkin's lymphoma, and SVT s/p ablation, and recent admission to [facility] for acute sigmoid diverticulitis.  The patient was admitted to [facility] from 10/13-10/18/22, treated with Zosyn and discharged on Vantin and Flagyl (course completed on 10/24).  Patient presented on 10/26 for worsening watery diarrhea with abdominal pain, admitted for management of diverticulitis.

## 2022-10-27 NOTE — PROGRESS NOTE ADULT - SUBJECTIVE AND OBJECTIVE BOX
Rj Degroot MD  [phone]    SUBJECTIVE:  Sitting up in bed.  No BM since admission.  No longer having abdominal pain, reports mild tenderness to deep palpation.  No nausea, but appetite decreased.  No SOB on RA.  NAD.    MEDICATIONS  (STANDING):  acetaminophen     Tablet .. 1000 milliGRAM(s) Oral every 6 hours  budesonide 160 MICROgram(s)/formoterol 4.5 MICROgram(s) Inhaler 2 Puff(s) Inhalation two times a day  dextrose 5%. 1000 milliLiter(s) (50 mL/Hr) IV Continuous <Continuous>  dextrose 5%. 1000 milliLiter(s) (100 mL/Hr) IV Continuous <Continuous>  dextrose 50% Injectable 25 Gram(s) IV Push once  dextrose 50% Injectable 12.5 Gram(s) IV Push once  dextrose 50% Injectable 25 Gram(s) IV Push once  enoxaparin Injectable 40 milliGRAM(s) SubCutaneous every 24 hours  glucagon  Injectable 1 milliGRAM(s) IntraMuscular once  insulin lispro (ADMELOG) corrective regimen sliding scale   SubCutaneous three times a day before meals  lactated ringers. 1000 milliLiter(s) (50 mL/Hr) IV Continuous <Continuous>  lactobacillus acidophilus 1 Tablet(s) Oral three times a day with meals  losartan 50 milliGRAM(s) Oral daily  montelukast 10 milliGRAM(s) Oral daily  pantoprazole  Injectable 40 milliGRAM(s) IV Push daily  piperacillin/tazobactam IVPB. 3.375 Gram(s) IV Intermittent once  piperacillin/tazobactam IVPB.- 3.375 Gram(s) IV Intermittent once  piperacillin/tazobactam IVPB.. 3.375 Gram(s) IV Intermittent every 8 hours  simvastatin 20 milliGRAM(s) Oral at bedtime  tiotropium 18 MICROgram(s) Capsule 1 Capsule(s) Inhalation daily    MEDICATIONS  (PRN):  dextrose Oral Gel 15 Gram(s) Oral once PRN Blood Glucose LESS THAN 70 milliGRAM(s)/deciliter  ibuprofen  Tablet. 600 milliGRAM(s) Oral every 6 hours PRN Moderate Pain (4 - 6)  melatonin 5 milliGRAM(s) Oral at bedtime PRN Insomnia      Vital Signs Last 24 Hrs  T(C): 36.6 (27 Oct 2022 11:01), Max: 36.7 (27 Oct 2022 00:00)  T(F): 97.8 (27 Oct 2022 11:01), Max: 98 (27 Oct 2022 00:00)  HR: 96 (27 Oct 2022 11:01) (88 - 104)  BP: 113/70 (27 Oct 2022 11:01) (105/73 - 122/60)  BP(mean): --  RR: 18 (27 Oct 2022 11:01) (17 - 18)  SpO2: 99% (27 Oct 2022 11:01) (96% - 99%)    Parameters below as of 27 Oct 2022 11:01  Patient On (Oxygen Delivery Method): room air        CAPILLARY BLOOD GLUCOSE      POCT Blood Glucose.: 106 mg/dL (27 Oct 2022 11:25)  POCT Blood Glucose.: 88 mg/dL (27 Oct 2022 09:01)  POCT Blood Glucose.: 90 mg/dL (27 Oct 2022 05:38)  POCT Blood Glucose.: 126 mg/dL (26 Oct 2022 22:04)  POCT Blood Glucose.: 77 mg/dL (26 Oct 2022 19:48)    I&O's Summary      PHYSICAL EXAM:  GENERAL: Looks stated age, NAD  CARDIOVASCULAR: Normal S1, S2  PULMONARY: Lungs clear to auscultation bilaterally. No wheezes/rales/rhonchi  GI: Abdomen non-distended, soft.  Mild LLQ tenderness to deep palpation.  Bowel sounds present  MSK/Ext:  No leg edema.  No calf tenderness bilaterally  PSYCH: Normal Affect.      LABS:                        13.6   8.21  )-----------( 260      ( 27 Oct 2022 07:16 )             41.3     10-27    140  |  108  |  10  ----------------------------<  95  4.0   |  27  |  0.66    Ca    8.6      27 Oct 2022 07:16  Phos  2.9     10-27  Mg     1.9     10-27    TPro  8.0  /  Alb  4.2  /  TBili  0.5  /  DBili  x   /  AST  38<H>  /  ALT  69  /  AlkPhos  55  10-26    PT/INR - ( 26 Oct 2022 15:00 )   PT: 13.9 sec;   INR: 1.19 ratio         PTT - ( 26 Oct 2022 15:00 )  PTT:39.1 sec            RADIOLOGY & ADDITIONAL TESTS:    < from: CT Abdomen and Pelvis w/ Oral Cont and w/ IV Cont (10.25.22 @ 16:58) >  FINDINGS:  LOWER CHEST: Poststernotomy changes. Elevated right hemidiaphragm.    LIVER: Within normal limits.  BILE DUCTS: Normal caliber.  GALLBLADDER: Cholelithiasis.  SPLEEN: Within normal limits.  PANCREAS: Within normal limits.  ADRENALS: Within normal limits.  KIDNEYS/URETERS: Bilateral cysts.    BLADDER: Only minimally distended and not well evaluated.  REPRODUCTIVE ORGANS: Prostate within normal limits.    BOWEL: No bowel obstruction. Appendix is normal.Small periampullary   duodenal diverticulum, containing contrast. Persistent changes of sigmoid   diverticulitis with slight interval improvement.  PERITONEUM: No ascites.  VESSELS: Within normal limits.  RETROPERITONEUM/LYMPH NODES: No lymphadenopathy.  ABDOMINAL WALL: Within normal limits.  BONES: Degenerative changes. Stable fracture of L1 vertebral body.    IMPRESSION:  Slight interval improvement in sigmoid diverticulitis. No new abnormality.    < end of copied text >

## 2022-10-27 NOTE — CONSULT NOTE ADULT - ASSESSMENT
diverticulitis  abdominal pain  abnormal CT    agree with IV zosyn  await c diff pcr  cont full liquid diet  Advance diet as tolerated  oob as tolerated  dvt prophylaxis  will need outpatient colonoscopy in 8 weeks  d/w patient
Patient with history of non hodgkins lymphoma diagnosed approx 1998 treated with thoracic surgery and chemotherapy with [facility]  Now admitted with diveritculitis  CT scan without evidence of significant adenopathy  CBC essentially normal    Recommendations:  1.  follow CBC  2.  continue present GI/ID/surgical followup  3.  No additional heme onc evaluation required and will re-evaluate as an outpatient
46 yo male with PMH T2DM, HTN, HLD, Asthma, Sleep apnea, Gout, Hodgkin's lymphoma who presents to the ED with abdominal cramps and diarrhea, admitted for management of diverticulitis.

## 2022-10-27 NOTE — ASU PATIENT PROFILE, ADULT - FALL HARM RISK - UNIVERSAL INTERVENTIONS
Bed in lowest position, wheels locked, appropriate side rails in place/Call bell, personal items and telephone in reach/Instruct patient to call for assistance before getting out of bed or chair/Non-slip footwear when patient is out of bed/[place] to call system/Physically safe environment - no spills, clutter or unnecessary equipment/Purposeful Proactive Rounding/Room/bathroom lighting operational, light cord in reach

## 2022-10-27 NOTE — CONSULT NOTE ADULT - CONSULT REASON
Diverticulitis
evaluation of non hodgkins lymphoma C85.82
Medical comanagement
diverticulitis  diarrhea

## 2022-10-27 NOTE — PROGRESS NOTE ADULT - SUBJECTIVE AND OBJECTIVE BOX
HOD # 1    SUBJECTIVE:  48 y/o M seen and examined at bedside with no acute overnight events. PT offers no complaints in NAD, tolerating clear liquids without n/v. PT denies any recent episodes of diarrhea. Pt denies chest pain, SOB, palpitations, fevers, chills, melena, or hematochezia     Vital Signs Last 24 Hrs  T(C): 36.4 (27 Oct 2022 05:41), Max: 36.7 (27 Oct 2022 00:00)  T(F): 97.5 (27 Oct 2022 05:41), Max: 98 (27 Oct 2022 00:00)  HR: 99 (27 Oct 2022 05:41) (88 - 99)  BP: 108/70 (27 Oct 2022 05:41) (107/73 - 122/60)  BP(mean): --  RR: 18 (27 Oct 2022 05:41) (17 - 18)  SpO2: 99% (27 Oct 2022 05:41) (97% - 99%)    Parameters below as of 27 Oct 2022 05:41  Patient On (Oxygen Delivery Method): room air    PHYSICAL EXAM:  GENERAL: No acute distress, well-developed  ABDOMEN: Soft, minimal llq ttp, nondistended   NEUROLOGY: responding appropriately, no focal deficits    I&O's Summary    I&O's Detail    MEDICATIONS  (STANDING):  acetaminophen     Tablet .. 1000 milliGRAM(s) Oral every 6 hours  budesonide 160 MICROgram(s)/formoterol 4.5 MICROgram(s) Inhaler 2 Puff(s) Inhalation two times a day  dextrose 5%. 1000 milliLiter(s) (50 mL/Hr) IV Continuous <Continuous>  dextrose 5%. 1000 milliLiter(s) (100 mL/Hr) IV Continuous <Continuous>  dextrose 50% Injectable 25 Gram(s) IV Push once  dextrose 50% Injectable 12.5 Gram(s) IV Push once  dextrose 50% Injectable 25 Gram(s) IV Push once  enoxaparin Injectable 40 milliGRAM(s) SubCutaneous every 24 hours  glucagon  Injectable 1 milliGRAM(s) IntraMuscular once  insulin lispro (ADMELOG) corrective regimen sliding scale   SubCutaneous three times a day before meals  lactated ringers. 1000 milliLiter(s) (50 mL/Hr) IV Continuous <Continuous>  losartan 50 milliGRAM(s) Oral daily  montelukast 10 milliGRAM(s) Oral daily  pantoprazole  Injectable 40 milliGRAM(s) IV Push daily  simvastatin 20 milliGRAM(s) Oral at bedtime  tiotropium 18 MICROgram(s) Capsule 1 Capsule(s) Inhalation daily    MEDICATIONS  (PRN):  dextrose Oral Gel 15 Gram(s) Oral once PRN Blood Glucose LESS THAN 70 milliGRAM(s)/deciliter  ibuprofen  Tablet. 600 milliGRAM(s) Oral every 6 hours PRN Moderate Pain (4 - 6)  melatonin 5 milliGRAM(s) Oral at bedtime PRN Insomnia    LABS:                        13.6   8.21  )-----------( 260      ( 27 Oct 2022 07:16 )             41.3     10-26    140  |  106  |  15  ----------------------------<  89  3.9   |  28  |  0.80    Ca    9.3      26 Oct 2022 15:00  Mg     1.9     10-26    TPro  8.0  /  Alb  4.2  /  TBili  0.5  /  DBili  x   /  AST  38<H>  /  ALT  69  /  AlkPhos  55  10-26    PT/INR - ( 26 Oct 2022 15:00 )   PT: 13.9 sec;   INR: 1.19 ratio         PTT - ( 26 Oct 2022 15:00 )  PTT:39.1 sec    ASSESSMENT  48 y/o M HOD # 1 admitted with diarrhea and poor oral intake, with recent episode of diverticulitis,     PLAN  - adv to FLD, f/u diet tolerance  - f/u c diff  - cont abx per ID  - serial abd exams  - trend labs  - discussed with attending         Surgical Team Contact Information  Spectralink: Ext: [phone] or [phone]  Pager: [phone]

## 2022-10-27 NOTE — CONSULT NOTE ADULT - SUBJECTIVE AND OBJECTIVE BOX
[facility]  Robert Soto PA-C  [address] [place] [id number]  [phone]      Chief Complaint:  Patient is a 47y old  Male who presents with a chief complaint of diverticulitis and diarrhea (27 Oct 2022 12:27)      HPI: This is a 47y year old Male with PMHx of HTN, HLD, T2DM, asthma, VIOLETA, gout, Hodgkin's lymphoma, denies abdominal surgical history presenting with complaint of diarrhea for past few days. Pt was recently hospitalized for diverticulitis from 10/13-10/18 which responded to IV abx. Pt was d/c'd on vantin/flagyl and has had complaint of diarrhea and poor PO intake. Pt states he has felt overall "unwell since discharge."  First episode of diverticulitis. Pt denies history of colonoscopy. Denies history of chest pain, shortness of breath, urinary complaints.     Allergies:  No Known Allergies      Medications:  acetaminophen     Tablet .. 1000 milliGRAM(s) Oral every 6 hours  budesonide 160 MICROgram(s)/formoterol 4.5 MICROgram(s) Inhaler 2 Puff(s) Inhalation two times a day  dextrose 5%. 1000 milliLiter(s) IV Continuous <Continuous>  dextrose 5%. 1000 milliLiter(s) IV Continuous <Continuous>  dextrose 50% Injectable 25 Gram(s) IV Push once  dextrose 50% Injectable 12.5 Gram(s) IV Push once  dextrose 50% Injectable 25 Gram(s) IV Push once  dextrose Oral Gel 15 Gram(s) Oral once PRN  enoxaparin Injectable 40 milliGRAM(s) SubCutaneous every 24 hours  glucagon  Injectable 1 milliGRAM(s) IntraMuscular once  ibuprofen  Tablet. 600 milliGRAM(s) Oral every 6 hours PRN  insulin lispro (ADMELOG) corrective regimen sliding scale   SubCutaneous three times a day before meals  lactated ringers. 1000 milliLiter(s) IV Continuous <Continuous>  lactobacillus acidophilus 1 Tablet(s) Oral three times a day with meals  losartan 50 milliGRAM(s) Oral daily  melatonin 5 milliGRAM(s) Oral at bedtime PRN  montelukast 10 milliGRAM(s) Oral daily  pantoprazole  Injectable 40 milliGRAM(s) IV Push daily  piperacillin/tazobactam IVPB.- 3.375 Gram(s) IV Intermittent once  piperacillin/tazobactam IVPB.. 3.375 Gram(s) IV Intermittent every 8 hours  simvastatin 20 milliGRAM(s) Oral at bedtime  tiotropium 18 MICROgram(s) Capsule 1 Capsule(s) Inhalation daily      PMHX/PSHX:  Asthma    Diabetes mellitus    Apnea    Gout    Lymphoma, Hodgkin&#x27;s    VIOLETA (obstructive sleep apnea)    HTN (hypertension)    HLD (hyperlipidemia)    Mediastinal tumor        Family history:  No pertinent family history in first degree relatives        Social History:     ROS:     General:  No wt loss, fevers, chills, night sweats, fatigue,   Eyes:  Good vision, no reported pain  ENT:  No sore throat, pain, runny nose, dysphagia  CV:  No pain, palpitations, hypo/hypertension  Resp:  No dyspnea, cough, tachypnea, wheezing  GI:  No pain, No nausea, No vomiting, + diarrhea, No constipation, No weight loss, No fever, No pruritis, No rectal bleeding, No tarry stools, No dysphagia,  :  No pain, bleeding, incontinence, nocturia  Muscle:  No pain, weakness  Neuro:  No weakness, tingling, memory problems  Psych:  No fatigue, insomnia, mood problems, depression  Endocrine:  No polyuria, polydipsia, cold/heat intolerance  Heme:  No petechiae, ecchymosis, easy bruisability  Skin:  No rash, tattoos, scars, edema      PHYSICAL EXAM:   Vital Signs:  Vital Signs Last 24 Hrs  T(C): 36.6 (27 Oct 2022 11:01), Max: 36.7 (27 Oct 2022 00:00)  T(F): 97.8 (27 Oct 2022 11:01), Max: 98 (27 Oct 2022 00:00)  HR: 96 (27 Oct 2022 11:01) (91 - 104)  BP: 113/70 (27 Oct 2022 11:01) (105/73 - 122/60)  BP(mean): --  RR: 18 (27 Oct 2022 11:01) (17 - 18)  SpO2: 99% (27 Oct 2022 11:01) (96% - 99%)    Parameters below as of 27 Oct 2022 11:01  Patient On (Oxygen Delivery Method): room air      Daily     Daily     GENERAL:  Appears stated age,   HEENT:  NC/AT,    CHEST:  Full & symmetric excursion,   HEART:  Regular rhythm  ABDOMEN:  Soft, non-tender, non-distended,   EXTEREMITIES:  no cyanosis,clubbing or edema  SKIN:  No rash  NEURO:  Alert,    LABS:                        13.6   8.21  )-----------( 260      ( 27 Oct 2022 07:16 )             41.3     10-27    140  |  108  |  10  ----------------------------<  95  4.0   |  27  |  0.66    Ca    8.6      27 Oct 2022 07:16  Phos  2.9     10-27  Mg     1.9     10-27    TPro  8.0  /  Alb  4.2  /  TBili  0.5  /  DBili  x   /  AST  38<H>  /  ALT  69  /  AlkPhos  55  10-26    LIVER FUNCTIONS - ( 26 Oct 2022 15:00 )  Alb: 4.2 g/dL / Pro: 8.0 g/dL / ALK PHOS: 55 U/L / ALT: 69 U/L / AST: 38 U/L / GGT: x           PT/INR - ( 26 Oct 2022 15:00 )   PT: 13.9 sec;   INR: 1.19 ratio         PTT - ( 26 Oct 2022 15:00 )  PTT:39.1 sec        Imaging:

## 2022-10-27 NOTE — PROGRESS NOTE ADULT - ASSESSMENT
As in above PA notation.  Mr. Whyte personally seen and examined during AM and PM rounds.  Vitals non-suggestive.  Appears more comfortable, less anixous.  Abdomen soft minimal tenderness to the LLQ.  Labs reassuring.  Clinically, improving overall with IVF and IV ABX.  Surgically, stable at present.  To continue current supportive care with start of full liquids.  Appreciate consultations and assistance from medical services.  Will review plan for transition to outpatient ABX regimen with ID- may need PICC for longer term IV ABX.

## 2022-10-27 NOTE — CONSULT NOTE ADULT - SUBJECTIVE AND OBJECTIVE BOX
Patient is a 47y old  Male who presents with a chief complaint of diverticulitis and diarrhea (27 Oct 2022 14:40)      HPI:  This is a 47y year old Male with PMHx of HTN, HLD, T2DM, asthma, VIOLETA, gout, Hodgkin's lymphoma, denies abdominal surgical history presenting with complaint of diarrhea for past few days. Pt was recently hospitalized for diverticulitis from 10/13-10/18 which responded to IV abx. Pt was d/c'd on vantin/flagyl and has had complaint of diarrhea and poor PO intake. Pt states he has felt overall "unwell since discharge."  First episode of diverticulitis. Pt denies history of colonoscopy. Denies history of chest pain, shortness of breath, urinary complaints.  (26 Oct 2022 17:37)       ROS:  Negative except for:    PAST MEDICAL & SURGICAL HISTORY:  Asthma      Diabetes mellitus      Gout      Lymphoma, Hodgkin&#x27;s      VIOLETA (obstructive sleep apnea)      HTN (hypertension)      HLD (hyperlipidemia)      Mediastinal tumor  removal          SOCIAL HISTORY:    FAMILY HISTORY:  No pertinent family history in first degree relatives        MEDICATIONS  (STANDING):  acetaminophen     Tablet .. 1000 milliGRAM(s) Oral every 6 hours  budesonide 160 MICROgram(s)/formoterol 4.5 MICROgram(s) Inhaler 2 Puff(s) Inhalation two times a day  dextrose 5%. 1000 milliLiter(s) (50 mL/Hr) IV Continuous <Continuous>  dextrose 5%. 1000 milliLiter(s) (100 mL/Hr) IV Continuous <Continuous>  dextrose 50% Injectable 25 Gram(s) IV Push once  dextrose 50% Injectable 12.5 Gram(s) IV Push once  dextrose 50% Injectable 25 Gram(s) IV Push once  enoxaparin Injectable 40 milliGRAM(s) SubCutaneous every 24 hours  glucagon  Injectable 1 milliGRAM(s) IntraMuscular once  insulin lispro (ADMELOG) corrective regimen sliding scale   SubCutaneous three times a day before meals  lactated ringers. 1000 milliLiter(s) (50 mL/Hr) IV Continuous <Continuous>  lactobacillus acidophilus 1 Tablet(s) Oral three times a day with meals  losartan 50 milliGRAM(s) Oral daily  montelukast 10 milliGRAM(s) Oral daily  pantoprazole  Injectable 40 milliGRAM(s) IV Push daily  piperacillin/tazobactam IVPB.. 3.375 Gram(s) IV Intermittent every 8 hours  simvastatin 20 milliGRAM(s) Oral at bedtime  tiotropium 18 MICROgram(s) Capsule 1 Capsule(s) Inhalation daily    MEDICATIONS  (PRN):  dextrose Oral Gel 15 Gram(s) Oral once PRN Blood Glucose LESS THAN 70 milliGRAM(s)/deciliter  ibuprofen  Tablet. 600 milliGRAM(s) Oral every 6 hours PRN Moderate Pain (4 - 6)  melatonin 5 milliGRAM(s) Oral at bedtime PRN Insomnia      Allergies    No Known Allergies    Intolerances        Vital Signs Last 24 Hrs  T(C): 36.4 (27 Oct 2022 20:52), Max: 36.7 (27 Oct 2022 00:00)  T(F): 97.6 (27 Oct 2022 20:52), Max: 98 (27 Oct 2022 00:00)  HR: 85 (27 Oct 2022 20:52) (85 - 104)  BP: 116/75 (27 Oct 2022 20:52) (105/73 - 122/60)  BP(mean): --  RR: 18 (27 Oct 2022 20:52) (17 - 18)  SpO2: 97% (27 Oct 2022 20:52) (96% - 99%)    Parameters below as of 27 Oct 2022 20:52  Patient On (Oxygen Delivery Method): room air        PHYSICAL EXAM  General: adult in NAD  HEENT: clear oropharynx, anicteric sclera, pink conjunctivae  Neck: supple  CV: normal S1S2 with no murmur rubs or gallops  Lungs: clear to auscultation, no wheezes, no rhales  Abdomen: soft non-tender non-distended, no hepato/splenomegaly  Ext: no clubbing cyanosis or edema  Skin: no rashes and no petichiae  Neuro: alert and oriented X3 no focal deficits      LABS:    CBC Full  -  ( 27 Oct 2022 07:16 )  WBC Count : 8.21 K/uL  RBC Count : 4.90 M/uL  Hemoglobin : 13.6 g/dL  Hematocrit : 41.3 %  Platelet Count - Automated : 260 K/uL  Mean Cell Volume : 84.3 fl  Mean Cell Hemoglobin : 27.8 pg  Mean Cell Hemoglobin Concentration : 32.9 gm/dL  Auto Neutrophil # : 5.47 K/uL  Auto Lymphocyte # : 1.98 K/uL  Auto Monocyte # : 0.45 K/uL  Auto Eosinophil # : 0.18 K/uL  Auto Basophil # : 0.06 K/uL  Auto Neutrophil % : 66.6 %  Auto Lymphocyte % : 24.1 %  Auto Monocyte % : 5.5 %  Auto Eosinophil % : 2.2 %  Auto Basophil % : 0.7 %    10-27    140  |  108  |  10  ----------------------------<  95  4.0   |  27  |  0.66    Ca    8.6      27 Oct 2022 07:16  Phos  2.9     10-27  Mg     1.9     10-27    TPro  8.0  /  Alb  4.2  /  TBili  0.5  /  DBili  x   /  AST  38<H>  /  ALT  69  /  AlkPhos  55  10-26    PT/INR - ( 26 Oct 2022 15:00 )   PT: 13.9 sec;   INR: 1.19 ratio         PTT - ( 26 Oct 2022 15:00 )  PTT:39.1 sec          BLOOD SMEAR INTERPRETATION:    RADIOLOGY & ADDITIONAL STUDIES:    < from: CT Abdomen and Pelvis w/ Oral Cont and w/ IV Cont (10.25.22 @ 16:58) >  EXAM: [id number] - CT ABDOMEN AND PELVIS OC IC  - ORDERED BY: JORJE LOZADA      PROCEDURE DATE:  10/25/2022           INTERPRETATION:  CLINICAL INFORMATION: Fatigue malaise, diarrhea. Acute   diverticulitis recently.    COMPARISON: 10/13/2022.    CONTRAST/COMPLICATIONS:  IV Contrast: Omnipaque 350  90 cc administered   10 cc discarded  Oral Contrast: Omnipaque 350  Complications: None reported at time of study completion    PROCEDURE:  CT of the Abdomen and Pelvis was performed.  Sagittal and coronal reformats were performed.    FINDINGS:  LOWER CHEST: Poststernotomy changes. Elevated right hemidiaphragm.    LIVER: Within normal limits.  BILE DUCTS: Normal caliber.  GALLBLADDER: Cholelithiasis.  SPLEEN: Within normal limits.  PANCREAS: Within normal limits.  ADRENALS: Within normal limits.  KIDNEYS/URETERS: Bilateral cysts.    BLADDER: Only minimally distended and not well evaluated.  REPRODUCTIVE ORGANS: Prostate within normal limits.    BOWEL: No bowel obstruction. Appendix is normal.Small periampullary   duodenal diverticulum, containing contrast. Persistent changes of sigmoid   diverticulitis with slight interval improvement.  PERITONEUM: No ascites.  VESSELS: Within normal limits.  RETROPERITONEUM/LYMPH NODES: No lymphadenopathy.  ABDOMINAL WALL: Within normal limits.  BONES: Degenerative changes. Stable fracture of L1 vertebral body.    IMPRESSION:  Slight interval improvement in sigmoid diverticulitis. No new abnormality.        --- End of Report ---               JENNIFER NORTON MD; Attending Radiologist   This document has been electronically signed. Oct 25 2022  5:11PM    < end of copied text >

## 2022-10-28 ENCOUNTER — TRANSCRIPTION ENCOUNTER (OUTPATIENT)
Age: 47
End: 2022-10-28

## 2022-10-28 LAB
C DIFF BY PCR RESULT: SIGNIFICANT CHANGE UP
GI PCR PANEL: SIGNIFICANT CHANGE UP
HCT VFR BLD CALC: 40.7 % — SIGNIFICANT CHANGE UP (ref 39–50)
HGB BLD-MCNC: 13.4 G/DL — SIGNIFICANT CHANGE UP (ref 13–17)
MCHC RBC-ENTMCNC: 28 PG — SIGNIFICANT CHANGE UP (ref 27–34)
MCHC RBC-ENTMCNC: 32.9 GM/DL — SIGNIFICANT CHANGE UP (ref 32–36)
MCV RBC AUTO: 85.1 FL — SIGNIFICANT CHANGE UP (ref 80–100)
NRBC # BLD: 0 /100 WBCS — SIGNIFICANT CHANGE UP (ref 0–0)
PLATELET # BLD AUTO: 249 K/UL — SIGNIFICANT CHANGE UP (ref 150–400)
RBC # BLD: 4.78 M/UL — SIGNIFICANT CHANGE UP (ref 4.2–5.8)
RBC # FLD: 12.8 % — SIGNIFICANT CHANGE UP (ref 10.3–14.5)
WBC # BLD: 6.4 K/UL — SIGNIFICANT CHANGE UP (ref 3.8–10.5)
WBC # FLD AUTO: 6.4 K/UL — SIGNIFICANT CHANGE UP (ref 3.8–10.5)

## 2022-10-28 PROCEDURE — 99233 SBSQ HOSP IP/OBS HIGH 50: CPT

## 2022-10-28 PROCEDURE — 99232 SBSQ HOSP IP/OBS MODERATE 35: CPT

## 2022-10-28 RX ADMIN — Medication 1000 MILLIGRAM(S): at 18:24

## 2022-10-28 RX ADMIN — ENOXAPARIN SODIUM 40 MILLIGRAM(S): 100 INJECTION SUBCUTANEOUS at 13:48

## 2022-10-28 RX ADMIN — PIPERACILLIN AND TAZOBACTAM 25 GRAM(S): 4; .5 INJECTION, POWDER, LYOPHILIZED, FOR SOLUTION INTRAVENOUS at 13:48

## 2022-10-28 RX ADMIN — MONTELUKAST 10 MILLIGRAM(S): 4 TABLET, CHEWABLE ORAL at 13:42

## 2022-10-28 RX ADMIN — Medication 1000 MILLIGRAM(S): at 18:16

## 2022-10-28 RX ADMIN — Medication 1 TABLET(S): at 13:48

## 2022-10-28 RX ADMIN — PANTOPRAZOLE SODIUM 40 MILLIGRAM(S): 20 TABLET, DELAYED RELEASE ORAL at 13:41

## 2022-10-28 RX ADMIN — Medication 1000 MILLIGRAM(S): at 06:49

## 2022-10-28 RX ADMIN — LOSARTAN POTASSIUM 50 MILLIGRAM(S): 100 TABLET, FILM COATED ORAL at 06:16

## 2022-10-28 RX ADMIN — BUDESONIDE AND FORMOTEROL FUMARATE DIHYDRATE 2 PUFF(S): 160; 4.5 AEROSOL RESPIRATORY (INHALATION) at 06:18

## 2022-10-28 RX ADMIN — TIOTROPIUM BROMIDE 1 CAPSULE(S): 18 CAPSULE ORAL; RESPIRATORY (INHALATION) at 06:30

## 2022-10-28 RX ADMIN — Medication 1 TABLET(S): at 08:34

## 2022-10-28 RX ADMIN — Medication 1000 MILLIGRAM(S): at 06:15

## 2022-10-28 RX ADMIN — PIPERACILLIN AND TAZOBACTAM 25 GRAM(S): 4; .5 INJECTION, POWDER, LYOPHILIZED, FOR SOLUTION INTRAVENOUS at 22:35

## 2022-10-28 RX ADMIN — Medication 1000 MILLIGRAM(S): at 14:24

## 2022-10-28 RX ADMIN — SIMVASTATIN 20 MILLIGRAM(S): 20 TABLET, FILM COATED ORAL at 22:34

## 2022-10-28 RX ADMIN — Medication 1 TABLET(S): at 18:16

## 2022-10-28 RX ADMIN — Medication 1000 MILLIGRAM(S): at 13:42

## 2022-10-28 RX ADMIN — BUDESONIDE AND FORMOTEROL FUMARATE DIHYDRATE 2 PUFF(S): 160; 4.5 AEROSOL RESPIRATORY (INHALATION) at 22:34

## 2022-10-28 RX ADMIN — PIPERACILLIN AND TAZOBACTAM 25 GRAM(S): 4; .5 INJECTION, POWDER, LYOPHILIZED, FOR SOLUTION INTRAVENOUS at 06:15

## 2022-10-28 NOTE — DISCHARGE NOTE PROVIDER - NSDCACTIVITY_GEN_ALL_CORE
No restrictions No restrictions/Showering allowed/Walking - Indoors allowed/Walking - Outdoors allowed

## 2022-10-28 NOTE — PROGRESS NOTE ADULT - ASSESSMENT
diverticulitis  abdominal pain  abnormal CT    agree with IV zosyn  await c diff pcr  cont full liquid diet  Advance diet as tolerated  oob as tolerated  dvt prophylaxis  will need outpatient colonoscopy in 8 weeks  d/w patient    I reviewed the overnight course of events on the unit, re-confirming the patient history. I discussed the care with the patient and their family  Differential diagnosis and plan of care discussed with patient after the evaluation  35 minutes spent on total encounter of which more than fifty percent of the encounter was spent counseling and/or coordinating care by the attending physician.  Advanced care planning was discussed with patient and family.  Advanced care planning forms were reviewed and discussed.  Risks, benefits and alternatives of gastroenterologic procedures were discussed in detail and all questions were answered.

## 2022-10-28 NOTE — PROGRESS NOTE ADULT - ASSESSMENT
As in above PA notation.  Mr. Vidales personally seen and examined during PM rounds.  "I feel better..., I'm hungry."  Taking full liquids well.  BM's still soft/ liquid, but better per his report.  NO PAIN AT REST.  Vitals non-suggestive.  Abdomen soft with +/- tenderness to deepest palpation of LLQ  Labs reassuring from today.  GI PCR and C. Diff negative.  Clinically, improving overall.  Surgically, stable for present.  To continue current supportive care with plan for Mid line/ PICC as available with conversion to home IV ABX to complete treatment.

## 2022-10-28 NOTE — DISCHARGE NOTE PROVIDER - CARE PROVIDER_API CALL
Karsten Scott)  Surgery  [address], [place] [id number]  Phone: [phone]  Fax: [phone]  Follow Up Time:    Karsten Scott (MD)  Surgery  [address] [place], [place] [id number]  Phone: [phone]  Fax: [phone]  Follow Up Time:     Freedom Carranza (DO)  Gastroenterology; Internal Medicine  [address] [place] [address]  Phone: [phone]  Fax: [phone]  Follow Up Time:

## 2022-10-28 NOTE — PROGRESS NOTE ADULT - ASSESSMENT
The patient is a 47-year-old man with PMH of Type 2 DM, HTN, HLD, Asthma, Sleep apnea, Gout, Hodgkin's lymphoma, and SVT s/p ablation, and recent admission to [facility] for acute sigmoid diverticulitis.  The patient was admitted to [facility] from 10/13-10/18/22, treated with Zosyn and discharged on Vantin and Flagyl (course completed on 10/24).  Patient presented on 10/26 for one week of worsening watery diarrhea with abdominal pain, admitted for management of diverticulitis.

## 2022-10-28 NOTE — PROGRESS NOTE ADULT - SUBJECTIVE AND OBJECTIVE BOX
[facility]  Robert Soto PA-C  [address] [place] [address]  [phone]      INTERVAL HPI/OVERNIGHT EVENTS:  Pt s/e  Reports diarrhea  However overall states he is feeling improved  No new GI complaints    MEDICATIONS  (STANDING):  acetaminophen     Tablet .. 1000 milliGRAM(s) Oral every 6 hours  budesonide 160 MICROgram(s)/formoterol 4.5 MICROgram(s) Inhaler 2 Puff(s) Inhalation two times a day  dextrose 5%. 1000 milliLiter(s) (50 mL/Hr) IV Continuous <Continuous>  dextrose 5%. 1000 milliLiter(s) (100 mL/Hr) IV Continuous <Continuous>  dextrose 50% Injectable 25 Gram(s) IV Push once  dextrose 50% Injectable 12.5 Gram(s) IV Push once  dextrose 50% Injectable 25 Gram(s) IV Push once  enoxaparin Injectable 40 milliGRAM(s) SubCutaneous every 24 hours  glucagon  Injectable 1 milliGRAM(s) IntraMuscular once  insulin lispro (ADMELOG) corrective regimen sliding scale   SubCutaneous three times a day before meals  lactated ringers. 1000 milliLiter(s) (50 mL/Hr) IV Continuous <Continuous>  lactobacillus acidophilus 1 Tablet(s) Oral three times a day with meals  losartan 50 milliGRAM(s) Oral daily  montelukast 10 milliGRAM(s) Oral daily  pantoprazole  Injectable 40 milliGRAM(s) IV Push daily  piperacillin/tazobactam IVPB.. 3.375 Gram(s) IV Intermittent every 8 hours  simvastatin 20 milliGRAM(s) Oral at bedtime  tiotropium 18 MICROgram(s) Capsule 1 Capsule(s) Inhalation daily    MEDICATIONS  (PRN):  dextrose Oral Gel 15 Gram(s) Oral once PRN Blood Glucose LESS THAN 70 milliGRAM(s)/deciliter  ibuprofen  Tablet. 600 milliGRAM(s) Oral every 6 hours PRN Moderate Pain (4 - 6)  melatonin 5 milliGRAM(s) Oral at bedtime PRN Insomnia      Allergies    No Known Allergies      PHYSICAL EXAM:   Vital Signs:  Vital Signs Last 24 Hrs  T(C): 36.4 (28 Oct 2022 05:36), Max: 36.7 (27 Oct 2022 16:41)  T(F): 97.5 (28 Oct 2022 05:36), Max: 98 (27 Oct 2022 16:41)  HR: 89 (28 Oct 2022 05:36) (78 - 90)  BP: 121/70 (28 Oct 2022 05:36) (108/71 - 121/70)  BP(mean): --  RR: 19 (28 Oct 2022 05:36) (17 - 19)  SpO2: 98% (28 Oct 2022 05:36) (97% - 99%)    Parameters below as of 28 Oct 2022 05:36  Patient On (Oxygen Delivery Method): BiPAP/CPAP    GENERAL:  Appears stated age  ABDOMEN:  Soft, non-tender, non-distended  NEURO:  Alert      LABS:                        13.4   6.40  )-----------( 249      ( 28 Oct 2022 05:45 )             40.7     10-27    140  |  108  |  10  ----------------------------<  95  4.0   |  27  |  0.66    Ca    8.6      27 Oct 2022 07:16  Phos  2.9     10-27  Mg     1.9     10-27    TPro  8.0  /  Alb  4.2  /  TBili  0.5  /  DBili  x   /  AST  38<H>  /  ALT  69  /  AlkPhos  55  10-26    PT/INR - ( 26 Oct 2022 15:00 )   PT: 13.9 sec;   INR: 1.19 ratio         PTT - ( 26 Oct 2022 15:00 )  PTT:39.1 sec

## 2022-10-28 NOTE — DISCHARGE NOTE PROVIDER - PROVIDER TOKENS
PROVIDER:[TOKEN:[[id number]]] PROVIDER:[TOKEN:[[id number]]],PROVIDER:[TOKEN:[[id number]]]

## 2022-10-28 NOTE — DISCHARGE NOTE PROVIDER - NSDCFUADDAPPT_GEN_ALL_CORE_FT
Follow up with Dr. Scott in his office in one week. Please call for appointment.    Follow up with GI Dr. Carranza in his office to schedule outpatient colonoscopy.

## 2022-10-28 NOTE — DISCHARGE NOTE PROVIDER - CARE PROVIDERS DIRECT ADDRESSES
[email or web address],DirectAddress_Unknown

## 2022-10-28 NOTE — PROGRESS NOTE ADULT - SUBJECTIVE AND OBJECTIVE BOX
HPI:  This is a 47y year old Male with PMHx of HTN, HLD, T2DM, asthma, VIOLETA, gout, Hodgkin's lymphoma, denies abdominal surgical history presenting with complaint of diarrhea for past few days. Pt was recently hospitalized for diverticulitis from 10/13-10/18 which responded to IV abx. Pt was d/c'd on vantin/flagyl and has had complaint of diarrhea and poor PO intake. Pt states he has felt overall "unwell since discharge."  First episode of diverticulitis. Pt denies history of colonoscopy. Denies history of chest pain, shortness of breath, urinary complaints.  (26 Oct 2022 17:37)      POD# s/p     SUBJECTIVE:  Patient seen and examined at bedside. Reports improvement in abdominal pain and diarrhea, endorses 2 semi-formed BMs yesterday. No overnight events. Patient reports no new complaints at this time. Admits to flatus and BM. Voiding, ambulating and tolerating diet.  Patient denies any fever, chills, chest pain, shortness of breath, nausea, vomiting, or urinary complaints.    VITALS  Vital Signs Last 24 Hrs  T(C): 36.4 (28 Oct 2022 05:36), Max: 36.7 (27 Oct 2022 16:41)  T(F): 97.5 (28 Oct 2022 05:36), Max: 98 (27 Oct 2022 16:41)  HR: 89 (28 Oct 2022 05:36) (78 - 104)  BP: 121/70 (28 Oct 2022 05:36) (105/73 - 121/70)  BP(mean): --  RR: 19 (28 Oct 2022 05:36) (17 - 19)  SpO2: 98% (28 Oct 2022 05:36) (96% - 99%)    Parameters below as of 28 Oct 2022 05:36  Patient On (Oxygen Delivery Method): BiPAP/CPAP    PHYSICAL EXAM  GENERAL:  Well-nourished, well-developed Male lying comfortably in bed in NAD.  HEENT:  NC/AT. Sclera white. Mucous membranes moist.  CARDIO:  Regular rate and rhythm.  No murmur, gallop or rub appreciated.  RESPIRATORY:  Nonlabored breathing, no accessory muscle use. Lungs clear to auscultation bilaterally.  No wheezing, rales or rhonchi appreciated.  ABDOMEN:  Soft, nondistended, mild LLQ tenderness to deep palpation. BS appreciated on auscultation. No rebound tenderness or guarding.  EXTREMITIES: No calf tenderness bilaterally.  SKIN:  No jaundice, pallor, or cyanosis  NEURO:  A&O x 3    INTAKE & OUTPUT  I&O's Summary    27 Oct 2022 07:01  -  28 Oct 2022 07:00  --------------------------------------------------------  IN: 650 mL / OUT: 0 mL / NET: 650 mL    I&O's Detail    27 Oct 2022 07:01  -  28 Oct 2022 07:00  --------------------------------------------------------  IN:    IV PiggyBack: 100 mL    Lactated Ringers: 550 mL  Total IN: 650 mL    OUT:  Total OUT: 0 mL    Total NET: 650 mL    MEDICATIONS  MEDICATIONS  (STANDING):  acetaminophen     Tablet .. 1000 milliGRAM(s) Oral every 6 hours  budesonide 160 MICROgram(s)/formoterol 4.5 MICROgram(s) Inhaler 2 Puff(s) Inhalation two times a day  dextrose 5%. 1000 milliLiter(s) (50 mL/Hr) IV Continuous <Continuous>  dextrose 5%. 1000 milliLiter(s) (100 mL/Hr) IV Continuous <Continuous>  dextrose 50% Injectable 25 Gram(s) IV Push once  dextrose 50% Injectable 12.5 Gram(s) IV Push once  dextrose 50% Injectable 25 Gram(s) IV Push once  enoxaparin Injectable 40 milliGRAM(s) SubCutaneous every 24 hours  glucagon  Injectable 1 milliGRAM(s) IntraMuscular once  insulin lispro (ADMELOG) corrective regimen sliding scale   SubCutaneous three times a day before meals  lactated ringers. 1000 milliLiter(s) (50 mL/Hr) IV Continuous <Continuous>  lactobacillus acidophilus 1 Tablet(s) Oral three times a day with meals  losartan 50 milliGRAM(s) Oral daily  montelukast 10 milliGRAM(s) Oral daily  pantoprazole  Injectable 40 milliGRAM(s) IV Push daily  piperacillin/tazobactam IVPB.. 3.375 Gram(s) IV Intermittent every 8 hours  simvastatin 20 milliGRAM(s) Oral at bedtime  tiotropium 18 MICROgram(s) Capsule 1 Capsule(s) Inhalation daily    MEDICATIONS  (PRN):  dextrose Oral Gel 15 Gram(s) Oral once PRN Blood Glucose LESS THAN 70 milliGRAM(s)/deciliter  ibuprofen  Tablet. 600 milliGRAM(s) Oral every 6 hours PRN Moderate Pain (4 - 6)  melatonin 5 milliGRAM(s) Oral at bedtime PRN Insomnia    LABS:             13.4   6.40  )-----------( 249      ( 28 Oct 2022 05:45 )             40.7     10-27    140  |  108  |  10  ----------------------------<  95  4.0   |  27  |  0.66    Ca    8.6      27 Oct 2022 07:16  Phos  2.9     10-27  Mg     1.9     10-27    TPro  8.0  /  Alb  4.2  /  TBili  0.5  /  DBili  x   /  AST  38<H>  /  ALT  69  /  AlkPhos  55  10-26    PT/INR - ( 26 Oct 2022 15:00 )   PT: 13.9 sec;   INR: 1.19 ratio    PTT - ( 26 Oct 2022 15:00 )  PTT:39.1 sec    ASSESSMENT & PLAN   47y Male POD# s/p    - Continue diet  - Continue antibiotics  - Serial abdominal exams  - Is & Os  - DVT prophylaxis with  - OOB, pain control  - Incentive spirometry  - Follow up AM labs  - Case to be discussed with   HPI:  This is a 47y year old Male with PMHx of HTN, HLD, T2DM, asthma, VIOLETA, gout, Hodgkin's lymphoma, denies abdominal surgical history presenting with complaint of diarrhea for past few days. Pt was recently hospitalized for diverticulitis from 10/13-10/18 which responded to IV abx. Pt was d/c'd on vantin/flagyl and has had complaint of diarrhea and poor PO intake. Pt states he has felt overall "unwell since discharge."  First episode of diverticulitis. Pt denies history of colonoscopy. Denies history of chest pain, shortness of breath, urinary complaints.  (26 Oct 2022 17:37)      POD# s/p     SUBJECTIVE:  Patient seen and examined at bedside. Reports improvement in abdominal pain and diarrhea, endorses 2 semi-formed BMs yesterday. No overnight events. Patient reports no new complaints at this time. Admits to flatus and BM. Voiding, ambulating and tolerating diet.  Patient denies any fever, chills, chest pain, shortness of breath, nausea, vomiting, or urinary complaints.    VITALS  Vital Signs Last 24 Hrs  T(C): 36.4 (28 Oct 2022 05:36), Max: 36.7 (27 Oct 2022 16:41)  T(F): 97.5 (28 Oct 2022 05:36), Max: 98 (27 Oct 2022 16:41)  HR: 89 (28 Oct 2022 05:36) (78 - 104)  BP: 121/70 (28 Oct 2022 05:36) (105/73 - 121/70)  BP(mean): --  RR: 19 (28 Oct 2022 05:36) (17 - 19)  SpO2: 98% (28 Oct 2022 05:36) (96% - 99%)    Parameters below as of 28 Oct 2022 05:36  Patient On (Oxygen Delivery Method): BiPAP/CPAP    PHYSICAL EXAM  GENERAL:  Well-nourished, well-developed Male lying comfortably in bed in NAD.  HEENT:  NC/AT. Sclera white. Mucous membranes moist.  CARDIO:  Regular rate and rhythm.  No murmur, gallop or rub appreciated.  RESPIRATORY:  Nonlabored breathing, no accessory muscle use. Lungs clear to auscultation bilaterally.  No wheezing, rales or rhonchi appreciated.  ABDOMEN:  Soft, nondistended, mild LLQ tenderness to deep palpation. BS appreciated on auscultation. No rebound tenderness or guarding.  EXTREMITIES: No calf tenderness bilaterally.  SKIN:  No jaundice, pallor, or cyanosis  NEURO:  A&O x 3    INTAKE & OUTPUT  I&O's Summary    27 Oct 2022 07:01  -  28 Oct 2022 07:00  --------------------------------------------------------  IN: 650 mL / OUT: 0 mL / NET: 650 mL    I&O's Detail    27 Oct 2022 07:01  -  28 Oct 2022 07:00  --------------------------------------------------------  IN:    IV PiggyBack: 100 mL    Lactated Ringers: 550 mL  Total IN: 650 mL    OUT:  Total OUT: 0 mL    Total NET: 650 mL    MEDICATIONS  MEDICATIONS  (STANDING):  acetaminophen     Tablet .. 1000 milliGRAM(s) Oral every 6 hours  budesonide 160 MICROgram(s)/formoterol 4.5 MICROgram(s) Inhaler 2 Puff(s) Inhalation two times a day  dextrose 5%. 1000 milliLiter(s) (50 mL/Hr) IV Continuous <Continuous>  dextrose 5%. 1000 milliLiter(s) (100 mL/Hr) IV Continuous <Continuous>  dextrose 50% Injectable 25 Gram(s) IV Push once  dextrose 50% Injectable 12.5 Gram(s) IV Push once  dextrose 50% Injectable 25 Gram(s) IV Push once  enoxaparin Injectable 40 milliGRAM(s) SubCutaneous every 24 hours  glucagon  Injectable 1 milliGRAM(s) IntraMuscular once  insulin lispro (ADMELOG) corrective regimen sliding scale   SubCutaneous three times a day before meals  lactated ringers. 1000 milliLiter(s) (50 mL/Hr) IV Continuous <Continuous>  lactobacillus acidophilus 1 Tablet(s) Oral three times a day with meals  losartan 50 milliGRAM(s) Oral daily  montelukast 10 milliGRAM(s) Oral daily  pantoprazole  Injectable 40 milliGRAM(s) IV Push daily  piperacillin/tazobactam IVPB.. 3.375 Gram(s) IV Intermittent every 8 hours  simvastatin 20 milliGRAM(s) Oral at bedtime  tiotropium 18 MICROgram(s) Capsule 1 Capsule(s) Inhalation daily    MEDICATIONS  (PRN):  dextrose Oral Gel 15 Gram(s) Oral once PRN Blood Glucose LESS THAN 70 milliGRAM(s)/deciliter  ibuprofen  Tablet. 600 milliGRAM(s) Oral every 6 hours PRN Moderate Pain (4 - 6)  melatonin 5 milliGRAM(s) Oral at bedtime PRN Insomnia    LABS:             13.4   6.40  )-----------( 249      ( 28 Oct 2022 05:45 )             40.7     10-27    140  |  108  |  10  ----------------------------<  95  4.0   |  27  |  0.66    Ca    8.6      27 Oct 2022 07:16  Phos  2.9     10-27  Mg     1.9     10-27    TPro  8.0  /  Alb  4.2  /  TBili  0.5  /  DBili  x   /  AST  38<H>  /  ALT  69  /  AlkPhos  55  10-26    PT/INR - ( 26 Oct 2022 15:00 )   PT: 13.9 sec;   INR: 1.19 ratio    PTT - ( 26 Oct 2022 15:00 )  PTT:39.1 sec    ASSESSMENT & PLAN   47y Male w/ PMH of HTN, HLD, T2DM, asthma, VIOLETA, gout, Hodgkin's lymphoma a/w sigmoid diverticulitis    - Continue diet  - Continue antibiotics  - Serial abdominal exams  - Is & Os  - DVT prophylaxis with  - OOB, pain control  - Incentive spirometry  - Follow up AM labs  - Case to be discussed with   SUBJECTIVE:  Patient seen and examined at bedside. Reports improvement in abdominal pain and diarrhea, endorses 2 semi-formed BMs yesterday. No overnight events. Patient reports no new complaints at this time. Admits to flatus and BM. Voiding, ambulating and tolerating diet.  Patient denies any fever, chills, chest pain, shortness of breath, nausea, vomiting, or urinary complaints.    VITALS  Vital Signs Last 24 Hrs  T(C): 36.4 (28 Oct 2022 05:36), Max: 36.7 (27 Oct 2022 16:41)  T(F): 97.5 (28 Oct 2022 05:36), Max: 98 (27 Oct 2022 16:41)  HR: 89 (28 Oct 2022 05:36) (78 - 104)  BP: 121/70 (28 Oct 2022 05:36) (105/73 - 121/70)  BP(mean): --  RR: 19 (28 Oct 2022 05:36) (17 - 19)  SpO2: 98% (28 Oct 2022 05:36) (96% - 99%)    Parameters below as of 28 Oct 2022 05:36  Patient On (Oxygen Delivery Method): BiPAP/CPAP    PHYSICAL EXAM  GENERAL:  Well-nourished, well-developed Male lying comfortably in bed in NAD.  HEENT:  NC/AT. Sclera white. Mucous membranes moist.  CARDIO:  Regular rate and rhythm.  No murmur, gallop or rub appreciated.  RESPIRATORY:  Nonlabored breathing, no accessory muscle use. Lungs clear to auscultation bilaterally.  No wheezing, rales or rhonchi appreciated.  ABDOMEN:  Soft, nondistended, mild LLQ tenderness to deep palpation. BS appreciated on auscultation. No rebound tenderness or guarding.  EXTREMITIES: No calf tenderness bilaterally.  SKIN:  No jaundice, pallor, or cyanosis  NEURO:  A&O x 3    INTAKE & OUTPUT  I&O's Summary    27 Oct 2022 07:01  -  28 Oct 2022 07:00  --------------------------------------------------------  IN: 650 mL / OUT: 0 mL / NET: 650 mL    I&O's Detail    27 Oct 2022 07:01  -  28 Oct 2022 07:00  --------------------------------------------------------  IN:    IV PiggyBack: 100 mL    Lactated Ringers: 550 mL  Total IN: 650 mL    OUT:  Total OUT: 0 mL    Total NET: 650 mL    MEDICATIONS  MEDICATIONS  (STANDING):  acetaminophen     Tablet .. 1000 milliGRAM(s) Oral every 6 hours  budesonide 160 MICROgram(s)/formoterol 4.5 MICROgram(s) Inhaler 2 Puff(s) Inhalation two times a day  dextrose 5%. 1000 milliLiter(s) (50 mL/Hr) IV Continuous <Continuous>  dextrose 5%. 1000 milliLiter(s) (100 mL/Hr) IV Continuous <Continuous>  dextrose 50% Injectable 25 Gram(s) IV Push once  dextrose 50% Injectable 12.5 Gram(s) IV Push once  dextrose 50% Injectable 25 Gram(s) IV Push once  enoxaparin Injectable 40 milliGRAM(s) SubCutaneous every 24 hours  glucagon  Injectable 1 milliGRAM(s) IntraMuscular once  insulin lispro (ADMELOG) corrective regimen sliding scale   SubCutaneous three times a day before meals  lactated ringers. 1000 milliLiter(s) (50 mL/Hr) IV Continuous <Continuous>  lactobacillus acidophilus 1 Tablet(s) Oral three times a day with meals  losartan 50 milliGRAM(s) Oral daily  montelukast 10 milliGRAM(s) Oral daily  pantoprazole  Injectable 40 milliGRAM(s) IV Push daily  piperacillin/tazobactam IVPB.. 3.375 Gram(s) IV Intermittent every 8 hours  simvastatin 20 milliGRAM(s) Oral at bedtime  tiotropium 18 MICROgram(s) Capsule 1 Capsule(s) Inhalation daily    MEDICATIONS  (PRN):  dextrose Oral Gel 15 Gram(s) Oral once PRN Blood Glucose LESS THAN 70 milliGRAM(s)/deciliter  ibuprofen  Tablet. 600 milliGRAM(s) Oral every 6 hours PRN Moderate Pain (4 - 6)  melatonin 5 milliGRAM(s) Oral at bedtime PRN Insomnia    LABS:             13.4   6.40  )-----------( 249      ( 28 Oct 2022 05:45 )             40.7     10-27    140  |  108  |  10  ----------------------------<  95  4.0   |  27  |  0.66    Ca    8.6      27 Oct 2022 07:16  Phos  2.9     10-27  Mg     1.9     10-27    TPro  8.0  /  Alb  4.2  /  TBili  0.5  /  DBili  x   /  AST  38<H>  /  ALT  69  /  AlkPhos  55  10-26    PT/INR - ( 26 Oct 2022 15:00 )   PT: 13.9 sec;   INR: 1.19 ratio    PTT - ( 26 Oct 2022 15:00 )  PTT:39.1 sec    ASSESSMENT & PLAN   47y Male w/ PMH of HTN, HLD, T2DM, asthma, VIOLETA, gout, Hodgkin's lymphoma a/w acute sigmoid diverticulitis. VSS, exam and labs reassuring.    - Continue diet  - Continue IV Zosyn  - OOB, pain control  - Follow up AM labs  - Case to be discussed with Dr. Scott SUBJECTIVE:  Patient seen and examined at bedside. Reports improvement in abdominal pain and diarrhea, endorses 2 semi-formed BMs yesterday. No overnight events. Patient reports no new complaints at this time. Admits to flatus and BM. Voiding, ambulating and tolerating diet.  Patient denies any fever, chills, chest pain, shortness of breath, nausea, vomiting, or urinary complaints.    VITALS  Vital Signs Last 24 Hrs  T(C): 36.4 (28 Oct 2022 05:36), Max: 36.7 (27 Oct 2022 16:41)  T(F): 97.5 (28 Oct 2022 05:36), Max: 98 (27 Oct 2022 16:41)  HR: 89 (28 Oct 2022 05:36) (78 - 104)  BP: 121/70 (28 Oct 2022 05:36) (105/73 - 121/70)  BP(mean): --  RR: 19 (28 Oct 2022 05:36) (17 - 19)  SpO2: 98% (28 Oct 2022 05:36) (96% - 99%)    Parameters below as of 28 Oct 2022 05:36  Patient On (Oxygen Delivery Method): BiPAP/CPAP    PHYSICAL EXAM  GENERAL:  Well-nourished, well-developed Male lying comfortably in bed in NAD.  HEENT:  NC/AT. Sclera white. Mucous membranes moist.  CARDIO:  Regular rate and rhythm.  No murmur, gallop or rub appreciated.  RESPIRATORY:  Nonlabored breathing, no accessory muscle use. Lungs clear to auscultation bilaterally.  No wheezing, rales or rhonchi appreciated.  ABDOMEN:  Soft, nondistended, mild LLQ tenderness to deep palpation. BS appreciated on auscultation. No rebound tenderness or guarding.  EXTREMITIES: No calf tenderness bilaterally.  SKIN:  No jaundice, pallor, or cyanosis  NEURO:  A&O x 3    INTAKE & OUTPUT  I&O's Summary    27 Oct 2022 07:01  -  28 Oct 2022 07:00  --------------------------------------------------------  IN: 650 mL / OUT: 0 mL / NET: 650 mL    I&O's Detail    27 Oct 2022 07:01  -  28 Oct 2022 07:00  --------------------------------------------------------  IN:    IV PiggyBack: 100 mL    Lactated Ringers: 550 mL  Total IN: 650 mL    OUT:  Total OUT: 0 mL    Total NET: 650 mL    MEDICATIONS  MEDICATIONS  (STANDING):  acetaminophen     Tablet .. 1000 milliGRAM(s) Oral every 6 hours  budesonide 160 MICROgram(s)/formoterol 4.5 MICROgram(s) Inhaler 2 Puff(s) Inhalation two times a day  dextrose 5%. 1000 milliLiter(s) (50 mL/Hr) IV Continuous <Continuous>  dextrose 5%. 1000 milliLiter(s) (100 mL/Hr) IV Continuous <Continuous>  dextrose 50% Injectable 25 Gram(s) IV Push once  dextrose 50% Injectable 12.5 Gram(s) IV Push once  dextrose 50% Injectable 25 Gram(s) IV Push once  enoxaparin Injectable 40 milliGRAM(s) SubCutaneous every 24 hours  glucagon  Injectable 1 milliGRAM(s) IntraMuscular once  insulin lispro (ADMELOG) corrective regimen sliding scale   SubCutaneous three times a day before meals  lactated ringers. 1000 milliLiter(s) (50 mL/Hr) IV Continuous <Continuous>  lactobacillus acidophilus 1 Tablet(s) Oral three times a day with meals  losartan 50 milliGRAM(s) Oral daily  montelukast 10 milliGRAM(s) Oral daily  pantoprazole  Injectable 40 milliGRAM(s) IV Push daily  piperacillin/tazobactam IVPB.. 3.375 Gram(s) IV Intermittent every 8 hours  simvastatin 20 milliGRAM(s) Oral at bedtime  tiotropium 18 MICROgram(s) Capsule 1 Capsule(s) Inhalation daily    MEDICATIONS  (PRN):  dextrose Oral Gel 15 Gram(s) Oral once PRN Blood Glucose LESS THAN 70 milliGRAM(s)/deciliter  ibuprofen  Tablet. 600 milliGRAM(s) Oral every 6 hours PRN Moderate Pain (4 - 6)  melatonin 5 milliGRAM(s) Oral at bedtime PRN Insomnia    LABS:             13.4   6.40  )-----------( 249      ( 28 Oct 2022 05:45 )             40.7     10-27    140  |  108  |  10  ----------------------------<  95  4.0   |  27  |  0.66    Ca    8.6      27 Oct 2022 07:16  Phos  2.9     10-27  Mg     1.9     10-27    TPro  8.0  /  Alb  4.2  /  TBili  0.5  /  DBili  x   /  AST  38<H>  /  ALT  69  /  AlkPhos  55  10-26    PT/INR - ( 26 Oct 2022 15:00 )   PT: 13.9 sec;   INR: 1.19 ratio    PTT - ( 26 Oct 2022 15:00 )  PTT:39.1 sec    ASSESSMENT & PLAN   47y Male w/ PMH of HTN, HLD, T2DM, asthma, VIOLETA, gout, Hodgkin's lymphoma a/w acute sigmoid diverticulitis. VSS, exam and labs reassuring.    - Continue diet  - Continue IV Zosyn  - OOB, pain control  - Follow up AM labs  - F/u ID reccs: outpt ABX via PICC?  - Case to be discussed with Dr. Scott

## 2022-10-28 NOTE — PROGRESS NOTE ADULT - SUBJECTIVE AND OBJECTIVE BOX
Rj Degroot MD  [phone]    SUBJECTIVE:  Resting in bed.  Reports three bowel movements over the past 24 hours, loose, but not completely watery as it had been prior to admission.  No abdominal pain.  Still with decreased appetite, but no nausea and tolerating full liquid diet.  No SOB on RA.  NAD.    MEDICATIONS  (STANDING):  acetaminophen     Tablet .. 1000 milliGRAM(s) Oral every 6 hours  budesonide 160 MICROgram(s)/formoterol 4.5 MICROgram(s) Inhaler 2 Puff(s) Inhalation two times a day  dextrose 5%. 1000 milliLiter(s) (50 mL/Hr) IV Continuous <Continuous>  dextrose 5%. 1000 milliLiter(s) (100 mL/Hr) IV Continuous <Continuous>  dextrose 50% Injectable 25 Gram(s) IV Push once  dextrose 50% Injectable 12.5 Gram(s) IV Push once  dextrose 50% Injectable 25 Gram(s) IV Push once  enoxaparin Injectable 40 milliGRAM(s) SubCutaneous every 24 hours  glucagon  Injectable 1 milliGRAM(s) IntraMuscular once  insulin lispro (ADMELOG) corrective regimen sliding scale   SubCutaneous three times a day before meals  lactated ringers. 1000 milliLiter(s) (50 mL/Hr) IV Continuous <Continuous>  lactobacillus acidophilus 1 Tablet(s) Oral three times a day with meals  losartan 50 milliGRAM(s) Oral daily  montelukast 10 milliGRAM(s) Oral daily  pantoprazole  Injectable 40 milliGRAM(s) IV Push daily  piperacillin/tazobactam IVPB.. 3.375 Gram(s) IV Intermittent every 8 hours  simvastatin 20 milliGRAM(s) Oral at bedtime  tiotropium 18 MICROgram(s) Capsule 1 Capsule(s) Inhalation daily    MEDICATIONS  (PRN):  dextrose Oral Gel 15 Gram(s) Oral once PRN Blood Glucose LESS THAN 70 milliGRAM(s)/deciliter  ibuprofen  Tablet. 600 milliGRAM(s) Oral every 6 hours PRN Moderate Pain (4 - 6)  melatonin 5 milliGRAM(s) Oral at bedtime PRN Insomnia      Vital Signs Last 24 Hrs  T(C): 36.4 (28 Oct 2022 05:36), Max: 36.7 (27 Oct 2022 16:41)  T(F): 97.5 (28 Oct 2022 05:36), Max: 98 (27 Oct 2022 16:41)  HR: 89 (28 Oct 2022 05:36) (78 - 96)  BP: 121/70 (28 Oct 2022 05:36) (108/71 - 121/70)  BP(mean): --  RR: 19 (28 Oct 2022 05:36) (17 - 19)  SpO2: 98% (28 Oct 2022 05:36) (97% - 99%)    Parameters below as of 28 Oct 2022 05:36  Patient On (Oxygen Delivery Method): BiPAP/CPAP        CAPILLARY BLOOD GLUCOSE      POCT Blood Glucose.: 106 mg/dL (28 Oct 2022 08:17)  POCT Blood Glucose.: 103 mg/dL (27 Oct 2022 21:15)  POCT Blood Glucose.: 99 mg/dL (27 Oct 2022 17:12)  POCT Blood Glucose.: 106 mg/dL (27 Oct 2022 11:25)    I&O's Summary    27 Oct 2022 07:01  -  28 Oct 2022 07:00  --------------------------------------------------------  IN: 650 mL / OUT: 0 mL / NET: 650 mL        PHYSICAL EXAM:  GENERAL: Looks stated age, NAD  CARDIOVASCULAR: Normal S1, S2  PULMONARY: Lungs clear to auscultation bilaterally. No wheezes/rales/rhonchi  GI: Abdomen non-distended, soft, very mild LLQ tenderness to deep palpation.  Bowel sounds present  MSK/Ext:  No leg edema.  No calf tenderness bilaterally  PSYCH: Normal Affect.      LABS:                        13.4   6.40  )-----------( 249      ( 28 Oct 2022 05:45 )             40.7     10-27    140  |  108  |  10  ----------------------------<  95  4.0   |  27  |  0.66    Ca    8.6      27 Oct 2022 07:16  Phos  2.9     10-27  Mg     1.9     10-27    TPro  8.0  /  Alb  4.2  /  TBili  0.5  /  DBili  x   /  AST  38<H>  /  ALT  69  /  AlkPhos  55  10-26    PT/INR - ( 26 Oct 2022 15:00 )   PT: 13.9 sec;   INR: 1.19 ratio         PTT - ( 26 Oct 2022 15:00 )  PTT:39.1 sec    GI PCR - negative  C Diff PCR - pending        RADIOLOGY & ADDITIONAL TESTS:

## 2022-10-28 NOTE — DISCHARGE NOTE PROVIDER - NSDCMRMEDTOKEN_GEN_ALL_CORE_FT
Breo Ellipta 200 mcg-25 mcg/inh inhalation powder: 1 puff(s) inhaled once a day  losartan 50 mg oral tablet: 1 tab(s) orally once a day  metFORMIN 1000 mg oral tablet, extended release: 1 tab(s) orally once a day  Multi Vitamin+: 1 tab(s) orally once a day  simvastatin 20 mg oral tablet: 1 tab(s) orally once a day (at bedtime)  Singulair 10 mg oral tablet: 1 tab(s) orally once a day  Spiriva Respimat: 2.5 microgram(s) inhaled 2 puffs once a day  Uloric 40 mg oral tablet: 1 tab(s) orally once a day  Vitamin D2 50 mcg (2000 intl units) oral capsule: 1 cap(s) orally once a day  Wegovy (1.7 mg dose) subcutaneous solution: 1 unit(s) subcutaneous once a week  Xolair Prefilled Syringe 150 mg/mL subcutaneous solution: 2 milliliter(s) subcutaneous every 2 weeks

## 2022-10-28 NOTE — DISCHARGE NOTE PROVIDER - HOSPITAL COURSE
HPI:This is a 47y year old Male with PMHx of HTN, HLD, T2DM, asthma, VIOLETA, gout, Hodgkin's lymphoma, denies abdominal surgical history presenting with complaint of diarrhea for past few days. Pt was recently hospitalized for diverticulitis from 10/13-10/18 which responded to IV abx. Pt was d/c'd on vantin/flagyl and has had complaint of diarrhea and poor PO intake. Pt states he has felt overall "unwell since discharge."  First episode of diverticulitis. Pt denies history of colonoscopy. Denies history of chest pain, shortness of breath, urinary complaints.  (26 Oct 2022 17:37)    Pt remained surgically stable. He was started on Zosyn. Wc normalized on HOD#1, pt remained afebrile. Medicine & ID consulted for co-management & IV abx recs. Pt c/o diarrhea, however GI PCR & C. diff panel both negative.  On HOD#2, pt had a midline placed in anticipation for d/c home w/ IV abx.

## 2022-10-28 NOTE — PROCEDURE NOTE - ADDITIONAL PROCEDURE DETAILS
JOSIAHE Midline placed as requested by surgical attending dr. miller for IVAB, pt is admitted for sigmoid diverticulitis, pending discharge once midline is completed, procedure completed without any complications, Verbal and written consent obtained and placed in chart, all procedure risk discussed with patient

## 2022-10-28 NOTE — PROGRESS NOTE ADULT - SUBJECTIVE AND OBJECTIVE BOX
[facility]  INFECTIOUS DISEASES   [address] [place] [address]  Tel: [phone]     Fax: [phone]  ======================================================  MD Amol Palomares Kaushal, MD Cho, Michelle, MD   ======================================================    [id number]  GENOVEVA ANDRES     Follow up: Diverticulitis    No fever, had 3 episodes of diarrhea since admission.   Abdominal pain is improving.    PAST MEDICAL & SURGICAL HISTORY:  Asthma  Diabetes mellitus  Gout  Lymphoma, Hodgkin&#x27;s  VIOLETA (obstructive sleep apnea)  Mediastinal tumor  removal    Social Hx: no smoking, ETOH or drugs     FAMILY HISTORY: Noncontributory     Allergies  No Known Allergies    Antibiotics:  MEDICATIONS  (STANDING):  ondansetron Injectable 4 milliGRAM(s) IV Push once  Zosyn     REVIEW OF SYSTEMS:  CONSTITUTIONAL:  No Fever or chills  HEENT:  No diplopia or blurred vision.  No sore throat or runny nose.  CARDIOVASCULAR:  No chest pain or SOB.  RESPIRATORY:  No cough, shortness of breath, PND or orthopnea.  GASTROINTESTINAL:  No nausea, vomiting but has diarrhea. + abdominal pain  GENITOURINARY:  No dysuria, frequency or urgency. No Blood in urine  MUSCULOSKELETAL:  no joint aches, no muscle pain  SKIN:  No change in skin, hair or nails.  NEUROLOGIC:  No paresthesias, fasciculations, seizures or weakness.  PSYCHIATRIC:  No disorder of thought or mood.  ENDOCRINE:  No heat or cold intolerance, polyuria or polydipsia.  HEMATOLOGICAL:  No easy bruising or bleeding.     Physical Exam:  Vital Signs Last 24 Hrs  T(C): 36.6 (28 Oct 2022 11:46), Max: 36.7 (27 Oct 2022 16:41)  T(F): 97.8 (28 Oct 2022 11:46), Max: 98 (27 Oct 2022 16:41)  HR: 85 (28 Oct 2022 11:46) (78 - 90)  BP: 121/71 (28 Oct 2022 11:46) (108/71 - 121/71)  BP(mean): --  RR: 20 (28 Oct 2022 11:46) (17 - 20)  SpO2: 99% (28 Oct 2022 11:46) (97% - 99%)  Parameters below as of 28 Oct 2022 11:46  Patient On (Oxygen Delivery Method): room air  GEN: NAD  HEENT: normocephalic and atraumatic. EOMI. PERRL.    NECK: Supple.  No lymphadenopathy   LUNGS: Clear to auscultation.  HEART: Regular rate and rhythm without murmur.  ABDOMEN: Soft, and nondistended, mild pain in deep palpation of LLQ, Positive bowel sounds.    : No CVA tenderness  EXTREMITIES: Without any cyanosis, clubbing, rash, lesions or edema.  NEUROLOGIC: grossly intact.  PSYCHIATRIC: Appropriate affect .  SKIN: No ulceration or rash     Labs:                        13.4   6.40  )-----------( 249      ( 28 Oct 2022 05:45 )             40.7     10-27    140  |  108  |  10  ----------------------------<  95  4.0   |  27  |  0.66    Ca    8.6      27 Oct 2022 07:16  Phos  2.9     10-27  Mg     1.9     10-27    TPro  8.0  /  Alb  4.2  /  TBili  0.5  /  DBili  x   /  AST  38<H>  /  ALT  69  /  AlkPhos  55  10-26    WBC Count: 6.40 K/uL (10-28-22 @ 05:45)  WBC Count: 8.21 K/uL (10-27-22 @ 07:16)  WBC Count: 8.58 K/uL (10-26-22 @ 15:00)    Creatinine, Serum: 0.66 mg/dL (10-27-22 @ 07:16)  Creatinine, Serum: 0.80 mg/dL (10-26-22 @ 15:00)    COVID-19 PCR: NotDetec (10-26-22 @ 15:00)  COVID-19 PCR: NotDetec (10-13-22 @ 14:46)    All imaging and other data have been reviewed.  < from: CT Abdomen and Pelvis w/ Oral Cont and w/ IV Cont (10.25.22 @ 16:58) >  EXAM: [id number] - CT ABDOMEN AND PELVIS OC IC  - ORDERED BY: JORJE LOZADA  PROCEDURE DATE:  10/25/2022    INTERPRETATION:  CLINICAL INFORMATION: Fatigue malaise, diarrhea. Acute   diverticulitis recently.  COMPARISON: 10/13/2022.  CONTRAST/COMPLICATIONS:  IV Contrast: Omnipaque 350  90 cc administered   10 cc discarded  Oral Contrast: Omnipaque 350  Complications: None reported at time of study completion  PROCEDURE:  CT of the Abdomen and Pelvis was performed.  Sagittal and coronal reformats were performed.  FINDINGS:  LOWER CHEST: Poststernotomy changes. Elevated right hemidiaphragm.  LIVER: Within normal limits.  BILE DUCTS: Normal caliber.  GALLBLADDER: Cholelithiasis.  SPLEEN: Within normal limits.  PANCREAS: Within normal limits.  ADRENALS: Within normal limits.  KIDNEYS/URETERS: Bilateral cysts.  BLADDER: Only minimally distended and not well evaluated.  REPRODUCTIVE ORGANS: Prostate within normal limits.  BOWEL: No bowel obstruction. Appendix is normal.Small periampullary   duodenal diverticulum, containing contrast. Persistent changes of sigmoid   diverticulitis with slight interval improvement.  PERITONEUM: No ascites.  VESSELS: Within normal limits.  RETROPERITONEUM/LYMPH NODES: No lymphadenopathy.  ABDOMINAL WALL: Within normal limits.  BONES: Degenerative changes. Stable fracture of L1 vertebral body.  IMPRESSION:  Slight interval improvement in sigmoid diverticulitis. No new abnormality.    Assessment and Plan:   HPI: 46yo man with PMH of DM, asthma, lymphoma in remission was admitted with severe diarrhea. He had diverticulitis admitted at  between 10/13 and 10/18, kept on zosyn with good response, no fever and   normalized WBC with clinical improvement so was switched to PO vantin and flagyl and sent home. Today he comes with severe diarrhea in last 2 days, repeat CT on 10/25 showed slightly improved diverticulitis but  still has persistent changes of sigmoid colon with diverticulitis.   Had diarrhea but less likely c diff since he didn't have any BM since admission yesterday, most likely diverticular disease causing inflammation and diarrhea.   No leukocytosis, no fever.   GI PCR negative     Recommendations:  - Will follow C diff PCR  - Continue zosyn 3.375gm q8   - Surgery/GI follow up noted     Will follow.    Gabbi Vilchis MD  Division of Infectious Diseases   Please call ID service at [phone] with any question.      35 minutes spent on total encounter assessing patient, examination, chart review, counseling and coordinating care by the attending physician/nurse/care manager.

## 2022-10-29 ENCOUNTER — TRANSCRIPTION ENCOUNTER (OUTPATIENT)
Age: 47
End: 2022-10-29

## 2022-10-29 VITALS
HEART RATE: 82 BPM | RESPIRATION RATE: 18 BRPM | DIASTOLIC BLOOD PRESSURE: 70 MMHG | TEMPERATURE: 99 F | OXYGEN SATURATION: 94 % | SYSTOLIC BLOOD PRESSURE: 127 MMHG

## 2022-10-29 LAB
HCT VFR BLD CALC: 40.9 % — SIGNIFICANT CHANGE UP (ref 39–50)
HGB BLD-MCNC: 13.7 G/DL — SIGNIFICANT CHANGE UP (ref 13–17)
MCHC RBC-ENTMCNC: 28.4 PG — SIGNIFICANT CHANGE UP (ref 27–34)
MCHC RBC-ENTMCNC: 33.5 GM/DL — SIGNIFICANT CHANGE UP (ref 32–36)
MCV RBC AUTO: 84.9 FL — SIGNIFICANT CHANGE UP (ref 80–100)
NRBC # BLD: 0 /100 WBCS — SIGNIFICANT CHANGE UP (ref 0–0)
PLATELET # BLD AUTO: 258 K/UL — SIGNIFICANT CHANGE UP (ref 150–400)
RBC # BLD: 4.82 M/UL — SIGNIFICANT CHANGE UP (ref 4.2–5.8)
RBC # FLD: 12.8 % — SIGNIFICANT CHANGE UP (ref 10.3–14.5)
WBC # BLD: 7.18 K/UL — SIGNIFICANT CHANGE UP (ref 3.8–10.5)
WBC # FLD AUTO: 7.18 K/UL — SIGNIFICANT CHANGE UP (ref 3.8–10.5)

## 2022-10-29 PROCEDURE — 86901 BLOOD TYPING SEROLOGIC RH(D): CPT

## 2022-10-29 PROCEDURE — 96365 THER/PROPH/DIAG IV INF INIT: CPT

## 2022-10-29 PROCEDURE — 99233 SBSQ HOSP IP/OBS HIGH 50: CPT

## 2022-10-29 PROCEDURE — 86900 BLOOD TYPING SEROLOGIC ABO: CPT

## 2022-10-29 PROCEDURE — U0003: CPT

## 2022-10-29 PROCEDURE — 85610 PROTHROMBIN TIME: CPT

## 2022-10-29 PROCEDURE — 99232 SBSQ HOSP IP/OBS MODERATE 35: CPT

## 2022-10-29 PROCEDURE — 94640 AIRWAY INHALATION TREATMENT: CPT

## 2022-10-29 PROCEDURE — 87493 C DIFF AMPLIFIED PROBE: CPT

## 2022-10-29 PROCEDURE — 36415 COLL VENOUS BLD VENIPUNCTURE: CPT

## 2022-10-29 PROCEDURE — 83735 ASSAY OF MAGNESIUM: CPT

## 2022-10-29 PROCEDURE — 84100 ASSAY OF PHOSPHORUS: CPT

## 2022-10-29 PROCEDURE — 86850 RBC ANTIBODY SCREEN: CPT

## 2022-10-29 PROCEDURE — 71045 X-RAY EXAM CHEST 1 VIEW: CPT

## 2022-10-29 PROCEDURE — 85025 COMPLETE CBC W/AUTO DIFF WBC: CPT

## 2022-10-29 PROCEDURE — 85730 THROMBOPLASTIN TIME PARTIAL: CPT

## 2022-10-29 PROCEDURE — 93005 ELECTROCARDIOGRAM TRACING: CPT

## 2022-10-29 PROCEDURE — 82962 GLUCOSE BLOOD TEST: CPT

## 2022-10-29 PROCEDURE — 99285 EMERGENCY DEPT VISIT HI MDM: CPT

## 2022-10-29 PROCEDURE — 80053 COMPREHEN METABOLIC PANEL: CPT

## 2022-10-29 PROCEDURE — U0005: CPT

## 2022-10-29 PROCEDURE — 80048 BASIC METABOLIC PNL TOTAL CA: CPT

## 2022-10-29 PROCEDURE — 87507 IADNA-DNA/RNA PROBE TQ 12-25: CPT

## 2022-10-29 PROCEDURE — 99239 HOSP IP/OBS DSCHRG MGMT >30: CPT

## 2022-10-29 PROCEDURE — 85027 COMPLETE CBC AUTOMATED: CPT

## 2022-10-29 RX ORDER — ERTAPENEM SODIUM 1 G/1
1000 INJECTION, POWDER, LYOPHILIZED, FOR SOLUTION INTRAMUSCULAR; INTRAVENOUS EVERY 24 HOURS
Refills: 0 | Status: COMPLETED | OUTPATIENT
Start: 2022-10-29 | End: 2022-10-29

## 2022-10-29 RX ADMIN — BUDESONIDE AND FORMOTEROL FUMARATE DIHYDRATE 2 PUFF(S): 160; 4.5 AEROSOL RESPIRATORY (INHALATION) at 08:51

## 2022-10-29 RX ADMIN — PANTOPRAZOLE SODIUM 40 MILLIGRAM(S): 20 TABLET, DELAYED RELEASE ORAL at 12:49

## 2022-10-29 RX ADMIN — PIPERACILLIN AND TAZOBACTAM 25 GRAM(S): 4; .5 INJECTION, POWDER, LYOPHILIZED, FOR SOLUTION INTRAVENOUS at 05:28

## 2022-10-29 RX ADMIN — Medication 1 TABLET(S): at 12:43

## 2022-10-29 RX ADMIN — ENOXAPARIN SODIUM 40 MILLIGRAM(S): 100 INJECTION SUBCUTANEOUS at 12:50

## 2022-10-29 RX ADMIN — TIOTROPIUM BROMIDE 1 CAPSULE(S): 18 CAPSULE ORAL; RESPIRATORY (INHALATION) at 08:48

## 2022-10-29 RX ADMIN — Medication 1000 MILLIGRAM(S): at 12:47

## 2022-10-29 RX ADMIN — ERTAPENEM SODIUM 120 MILLIGRAM(S): 1 INJECTION, POWDER, LYOPHILIZED, FOR SOLUTION INTRAMUSCULAR; INTRAVENOUS at 12:31

## 2022-10-29 RX ADMIN — MONTELUKAST 10 MILLIGRAM(S): 4 TABLET, CHEWABLE ORAL at 12:48

## 2022-10-29 RX ADMIN — Medication 1 TABLET(S): at 08:51

## 2022-10-29 RX ADMIN — SODIUM CHLORIDE 50 MILLILITER(S): 9 INJECTION, SOLUTION INTRAVENOUS at 05:34

## 2022-10-29 NOTE — PROGRESS NOTE ADULT - PROBLEM SELECTOR PROBLEM 5
VIOLETA (obstructive sleep apnea)

## 2022-10-29 NOTE — PROGRESS NOTE ADULT - REASON FOR ADMISSION
diverticulitis and diarrhea

## 2022-10-29 NOTE — PROGRESS NOTE ADULT - SUBJECTIVE AND OBJECTIVE BOX
Patient is a 47y old  Male who presents with a chief complaint of diverticulitis and diarrhea (28 Oct 2022 23:13)      INTERVAL HPI/OVERNIGHT EVENTS:  Patient seen and examined at bedside. Lying in bed comfortably. No acute events. Denies abdominal pain. Has tolerated full liquid diet. He denies any chest pain, SOB, palpitations, nausea, vomiting.     MEDICATIONS  (STANDING):  acetaminophen     Tablet .. 1000 milliGRAM(s) Oral every 6 hours  budesonide 160 MICROgram(s)/formoterol 4.5 MICROgram(s) Inhaler 2 Puff(s) Inhalation two times a day  dextrose 5%. 1000 milliLiter(s) (50 mL/Hr) IV Continuous <Continuous>  dextrose 5%. 1000 milliLiter(s) (100 mL/Hr) IV Continuous <Continuous>  dextrose 50% Injectable 25 Gram(s) IV Push once  dextrose 50% Injectable 12.5 Gram(s) IV Push once  dextrose 50% Injectable 25 Gram(s) IV Push once  enoxaparin Injectable 40 milliGRAM(s) SubCutaneous every 24 hours  glucagon  Injectable 1 milliGRAM(s) IntraMuscular once  insulin lispro (ADMELOG) corrective regimen sliding scale   SubCutaneous three times a day before meals  lactated ringers. 1000 milliLiter(s) (50 mL/Hr) IV Continuous <Continuous>  lactobacillus acidophilus 1 Tablet(s) Oral three times a day with meals  losartan 50 milliGRAM(s) Oral daily  montelukast 10 milliGRAM(s) Oral daily  pantoprazole  Injectable 40 milliGRAM(s) IV Push daily  piperacillin/tazobactam IVPB.. 3.375 Gram(s) IV Intermittent every 8 hours  simvastatin 20 milliGRAM(s) Oral at bedtime  tiotropium 18 MICROgram(s) Capsule 1 Capsule(s) Inhalation daily    MEDICATIONS  (PRN):  dextrose Oral Gel 15 Gram(s) Oral once PRN Blood Glucose LESS THAN 70 milliGRAM(s)/deciliter  ibuprofen  Tablet. 600 milliGRAM(s) Oral every 6 hours PRN Moderate Pain (4 - 6)  melatonin 5 milliGRAM(s) Oral at bedtime PRN Insomnia      Allergies    No Known Allergies    Intolerances        REVIEW OF SYSTEMS:  as per HPI    Vital Signs Last 24 Hrs  T(C): 36.5 (29 Oct 2022 05:38), Max: 36.8 (28 Oct 2022 20:13)  T(F): 97.7 (29 Oct 2022 05:38), Max: 98.2 (28 Oct 2022 20:13)  HR: 88 (29 Oct 2022 05:38) (85 - 88)  BP: 99/70 (29 Oct 2022 05:38) (99/70 - 132/82)  BP(mean): --  RR: 18 (29 Oct 2022 05:38) (18 - 20)  SpO2: 95% (29 Oct 2022 05:38) (89% - 99%)    Parameters below as of 29 Oct 2022 05:38  Patient On (Oxygen Delivery Method): room air        PHYSICAL EXAM:  GENERAL: NAD  HEENT:  anicteric, moist mucous membranes  CHEST/LUNG:  CTA b/l, no rales, wheezes, or rhonchi  HEART:  RRR, S1, S2  ABDOMEN:  BS+, soft, nontender, nondistended  EXTREMITIES: no edema, cyanosis, or calf tenderness  NERVOUS SYSTEM: answers questions and follows commands appropriately    LABS:    CBC Full  -  ( 28 Oct 2022 05:45 )  WBC Count : 6.40 K/uL  Hemoglobin : 13.4 g/dL  Hematocrit : 40.7 %  Platelet Count - Automated : 249 K/uL  Mean Cell Volume : 85.1 fl  Mean Cell Hemoglobin : 28.0 pg  Mean Cell Hemoglobin Concentration : 32.9 gm/dL  Auto Neutrophil # : x  Auto Lymphocyte # : x  Auto Monocyte # : x  Auto Eosinophil # : x  Auto Basophil # : x  Auto Neutrophil % : x  Auto Lymphocyte % : x  Auto Monocyte % : x  Auto Eosinophil % : x  Auto Basophil % : x      Ca    8.6        27 Oct 2022 07:16          CAPILLARY BLOOD GLUCOSE      POCT Blood Glucose.: 91 mg/dL (28 Oct 2022 21:50)  POCT Blood Glucose.: 91 mg/dL (28 Oct 2022 18:14)  POCT Blood Glucose.: 89 mg/dL (28 Oct 2022 11:34)  POCT Blood Glucose.: 106 mg/dL (28 Oct 2022 08:17)          RADIOLOGY & ADDITIONAL TESTS:    Personally reviewed.     Consultant(s) Notes Reviewed:  [x] YES  [ ] NO     Patient is a 47y old  Male who presents with a chief complaint of diverticulitis and diarrhea (28 Oct 2022 23:13)      INTERVAL HPI/OVERNIGHT EVENTS:  Patient seen and examined at bedside. Lying in bed comfortably. No acute events. Denies abdominal pain. Has tolerated full liquid diet. He denies any chest pain, SOB, palpitations, nausea, vomiting, or diarrhea.    MEDICATIONS  (STANDING):  acetaminophen     Tablet .. 1000 milliGRAM(s) Oral every 6 hours  budesonide 160 MICROgram(s)/formoterol 4.5 MICROgram(s) Inhaler 2 Puff(s) Inhalation two times a day  dextrose 5%. 1000 milliLiter(s) (50 mL/Hr) IV Continuous <Continuous>  dextrose 5%. 1000 milliLiter(s) (100 mL/Hr) IV Continuous <Continuous>  dextrose 50% Injectable 25 Gram(s) IV Push once  dextrose 50% Injectable 12.5 Gram(s) IV Push once  dextrose 50% Injectable 25 Gram(s) IV Push once  enoxaparin Injectable 40 milliGRAM(s) SubCutaneous every 24 hours  glucagon  Injectable 1 milliGRAM(s) IntraMuscular once  insulin lispro (ADMELOG) corrective regimen sliding scale   SubCutaneous three times a day before meals  lactated ringers. 1000 milliLiter(s) (50 mL/Hr) IV Continuous <Continuous>  lactobacillus acidophilus 1 Tablet(s) Oral three times a day with meals  losartan 50 milliGRAM(s) Oral daily  montelukast 10 milliGRAM(s) Oral daily  pantoprazole  Injectable 40 milliGRAM(s) IV Push daily  piperacillin/tazobactam IVPB.. 3.375 Gram(s) IV Intermittent every 8 hours  simvastatin 20 milliGRAM(s) Oral at bedtime  tiotropium 18 MICROgram(s) Capsule 1 Capsule(s) Inhalation daily    MEDICATIONS  (PRN):  dextrose Oral Gel 15 Gram(s) Oral once PRN Blood Glucose LESS THAN 70 milliGRAM(s)/deciliter  ibuprofen  Tablet. 600 milliGRAM(s) Oral every 6 hours PRN Moderate Pain (4 - 6)  melatonin 5 milliGRAM(s) Oral at bedtime PRN Insomnia      Allergies    No Known Allergies    Intolerances        REVIEW OF SYSTEMS:  as per HPI    Vital Signs Last 24 Hrs  T(C): 36.5 (29 Oct 2022 05:38), Max: 36.8 (28 Oct 2022 20:13)  T(F): 97.7 (29 Oct 2022 05:38), Max: 98.2 (28 Oct 2022 20:13)  HR: 88 (29 Oct 2022 05:38) (85 - 88)  BP: 99/70 (29 Oct 2022 05:38) (99/70 - 132/82)  BP(mean): --  RR: 18 (29 Oct 2022 05:38) (18 - 20)  SpO2: 95% (29 Oct 2022 05:38) (89% - 99%)    Parameters below as of 29 Oct 2022 05:38  Patient On (Oxygen Delivery Method): room air        PHYSICAL EXAM:  GENERAL: NAD  HEENT:  anicteric, moist mucous membranes  CHEST/LUNG:  CTA b/l, no rales, wheezes, or rhonchi  HEART:  RRR, S1, S2  ABDOMEN:  BS+, soft, nontender, nondistended  EXTREMITIES: no edema, cyanosis, or calf tenderness  NERVOUS SYSTEM: answers questions and follows commands appropriately    LABS:  Today's labs are pending.     Yesterday's labs below:   CBC Full  -  ( 28 Oct 2022 05:45 )  WBC Count : 6.40 K/uL  Hemoglobin : 13.4 g/dL  Hematocrit : 40.7 %  Platelet Count - Automated : 249 K/uL  Mean Cell Volume : 85.1 fl  Mean Cell Hemoglobin : 28.0 pg  Mean Cell Hemoglobin Concentration : 32.9 gm/dL  Auto Neutrophil # : x  Auto Lymphocyte # : x  Auto Monocyte # : x  Auto Eosinophil # : x  Auto Basophil # : x  Auto Neutrophil % : x  Auto Lymphocyte % : x  Auto Monocyte % : x  Auto Eosinophil % : x  Auto Basophil % : x      Ca    8.6        27 Oct 2022 07:16          CAPILLARY BLOOD GLUCOSE      POCT Blood Glucose.: 91 mg/dL (28 Oct 2022 21:50)  POCT Blood Glucose.: 91 mg/dL (28 Oct 2022 18:14)  POCT Blood Glucose.: 89 mg/dL (28 Oct 2022 11:34)  POCT Blood Glucose.: 106 mg/dL (28 Oct 2022 08:17)          RADIOLOGY & ADDITIONAL TESTS:    Personally reviewed.     Consultant(s) Notes Reviewed:  [x] YES  [ ] NO

## 2022-10-29 NOTE — PROGRESS NOTE ADULT - ASSESSMENT
47y Male w/ PMH of HTN, HLD, T2DM, asthma, VIOLETA, gout, Hodgkin's lymphoma a/w acute sigmoid diverticulitis, GI PCR and C. dif negative.   47y Male w/ PMH of HTN, HLD, T2DM, asthma, VIOLETA, gout, Hodgkin's lymphoma a/w acute sigmoid diverticulitis, GI PCR and C. dif negative.    As in above PA notation.  Mr. Whyte personally seen and examined during PM rounds prior to discharge.  Denies pain.  Eating well.  Eager for discharge!  Vitals non-suggestive last 24 hours.  Abdomen soft with no appreciable LLQ tenderness.  Labs reassuring from today.  Clinically, improving overall with course of IV ABX.  Surgically, stable for present.  To continue current supportive care with discharge to home.  Plan for week of INVANZ, low residue diet and outpatient Surgery and GI care.

## 2022-10-29 NOTE — PROGRESS NOTE ADULT - SUBJECTIVE AND OBJECTIVE BOX
SUBJECTIVE:  Patient seen and examined at bedside. Patient feeling well without complaints at this time. Midline placement by ICU last night. Tolerating full liquid diet. +BMs.    Vital Signs Last 24 Hrs  T(C): 36.5 (29 Oct 2022 05:38), Max: 36.8 (28 Oct 2022 20:13)  T(F): 97.7 (29 Oct 2022 05:38), Max: 98.2 (28 Oct 2022 20:13)  HR: 88 (29 Oct 2022 05:38) (85 - 88)  BP: 99/70 (29 Oct 2022 05:38) (99/70 - 132/82)  BP(mean): --  RR: 18 (29 Oct 2022 05:38) (18 - 20)  SpO2: 95% (29 Oct 2022 05:38) (89% - 99%)    Parameters below as of 29 Oct 2022 05:38  Patient On (Oxygen Delivery Method): room air    PHYSICAL EXAM:  GENERAL: NAD, resting in bed, appears comfortable   HEAD:  Atraumatic, Normocephalic  CHEST/LUNG: Nonlabored   HEART: RRR  ABDOMEN: Soft, nondistended. Nontender to palpation. No rebound or guarding. +BS  EXT: No calf tenderness b/l.   NEUROLOGY: responding appropriately, no focal deficits    acetaminophen     Tablet .. 1000 milliGRAM(s) Oral every 6 hours  budesonide 160 MICROgram(s)/formoterol 4.5 MICROgram(s) Inhaler 2 Puff(s) Inhalation two times a day  dextrose 5%. 1000 milliLiter(s) IV Continuous <Continuous>  dextrose 5%. 1000 milliLiter(s) IV Continuous <Continuous>  dextrose 50% Injectable 25 Gram(s) IV Push once  dextrose 50% Injectable 12.5 Gram(s) IV Push once  dextrose 50% Injectable 25 Gram(s) IV Push once  dextrose Oral Gel 15 Gram(s) Oral once PRN  enoxaparin Injectable 40 milliGRAM(s) SubCutaneous every 24 hours  glucagon  Injectable 1 milliGRAM(s) IntraMuscular once  ibuprofen  Tablet. 600 milliGRAM(s) Oral every 6 hours PRN  insulin lispro (ADMELOG) corrective regimen sliding scale   SubCutaneous three times a day before meals  lactated ringers. 1000 milliLiter(s) IV Continuous <Continuous>  lactobacillus acidophilus 1 Tablet(s) Oral three times a day with meals  losartan 50 milliGRAM(s) Oral daily  melatonin 5 milliGRAM(s) Oral at bedtime PRN  montelukast 10 milliGRAM(s) Oral daily  pantoprazole  Injectable 40 milliGRAM(s) IV Push daily  piperacillin/tazobactam IVPB.. 3.375 Gram(s) IV Intermittent every 8 hours  simvastatin 20 milliGRAM(s) Oral at bedtime  tiotropium 18 MICROgram(s) Capsule 1 Capsule(s) Inhalation daily      LABS:                        13.7   7.18  )-----------( 258      ( 29 Oct 2022 06:03 )             40.9

## 2022-10-29 NOTE — PROGRESS NOTE ADULT - PROBLEM SELECTOR PLAN 1
Pain control, supportive care  OOB, ambulation  Advanced to low fiber diet, f/u tolerance  Continue IV abx as per ID  Discharge planning with home care for completion of IV abx course and outpatient follow up with surgery/GI.
Continue Zosyn per ID.  Diarrhea and abdominal pain resolved.  Remains afebrile with no leukocytosis.  GI and C Diff PCRs ordered and pending.
Continue Zosyn per ID. Has had midline placed for home antibiotics upon discharge.  Diarrhea and abdominal pain resolved.  Remains afebrile with no leukocytosis.  GI PCR negative.  C Diff PCR negative.   Tolerating full liquid diet, diet advancement per surgery.
Continue Zosyn per ID.  Diarrhea and abdominal pain resolved.  Remains afebrile with no leukocytosis.  GI PCR negative.  C Diff PCR sent and pending.

## 2022-10-29 NOTE — DISCHARGE NOTE NURSING/CASE MANAGEMENT/SOCIAL WORK - NSDCPEFALRISK_GEN_ALL_CORE
For information on Fall & Injury Prevention, visit: [email or web address] OR  [email or web address] OR  https://www.cdc.gov/steadi/patient.html

## 2022-10-29 NOTE — PROGRESS NOTE ADULT - PROBLEM SELECTOR PLAN 7
On febuxostat at home, non-formulary.  May resume on discharge.

## 2022-10-29 NOTE — PROGRESS NOTE ADULT - TIME BILLING
Reviewed with patient in detail, Hospitalist team, GI and Hematology attendings, Surgical PA and today's RN team.
Reviewed with patient in detail, family at bedside, ID attending, Surgical PA and today's RN team.
Reviewed with patient in detail, Surgical PA and today's RN team.

## 2022-10-29 NOTE — PROGRESS NOTE ADULT - PROBLEM SELECTOR PLAN 2
A1C 5.6 earlier this month, on Metformin and Wegovy at home, currently being held.  Has not required any Admelog coverage, but blood sugars may increase as appetite increases.  Continue to monitor finger sticks.

## 2022-10-29 NOTE — PROGRESS NOTE ADULT - ASSESSMENT
The patient is a 47-year-old man with PMH of Type 2 DM, HTN, HLD, Asthma, Sleep apnea, Gout, Hodgkin's lymphoma, and SVT s/p ablation, and recent admission to [facility] for acute sigmoid diverticulitis.  The patient was admitted to [facility] from 10/13-10/18/22, treated with Zosyn and discharged on Vantin and Flagyl (course completed on 10/24).  Patient presented on 10/26 for one week of worsening watery diarrhea with abdominal pain, admitted for management of diverticulitis.

## 2022-10-29 NOTE — PROGRESS NOTE ADULT - PROBLEM SELECTOR PLAN 4
Stable, continue current meds.
Stable, continue Symbicort, Spiriva, Singulair.
Stable, continue Symbicort, Spiriva, Singulair.

## 2022-10-29 NOTE — PROGRESS NOTE ADULT - SUBJECTIVE AND OBJECTIVE BOX
[facility]  INFECTIOUS DISEASES   [address] [place] [address]  Tel: [phone]     Fax: [phone]  ======================================================  MD Amol Palomares, MD Florentin Meier Michelle, MD   ======================================================    [facility]-[id number]  GENOVEVA ANDRES     Follow up: Diverticulitis    No fever, no more diarrhea, very minimal thick stool. On regular diet.   Abdominal pain is improving.    PAST MEDICAL & SURGICAL HISTORY:  Asthma  Diabetes mellitus  Gout  Lymphoma, Hodgkin&#x27;s  VIOLETA (obstructive sleep apnea)  Mediastinal tumor  removal    Social Hx: no smoking, ETOH or drugs     FAMILY HISTORY: Noncontributory     Allergies  No Known Allergies    Antibiotics:  MEDICATIONS  (STANDING):  ondansetron Injectable 4 milliGRAM(s) IV Push once  Zosyn     REVIEW OF SYSTEMS:  CONSTITUTIONAL:  No Fever or chills  HEENT:  No diplopia or blurred vision.  No sore throat or runny nose.  CARDIOVASCULAR:  No chest pain or SOB.  RESPIRATORY:  No cough, shortness of breath, PND or orthopnea.  GASTROINTESTINAL:  No nausea, vomiting but has diarrhea. + abdominal pain  GENITOURINARY:  No dysuria, frequency or urgency. No Blood in urine  MUSCULOSKELETAL:  no joint aches, no muscle pain  SKIN:  No change in skin, hair or nails.  NEUROLOGIC:  No paresthesias, fasciculations, seizures or weakness.  PSYCHIATRIC:  No disorder of thought or mood.  ENDOCRINE:  No heat or cold intolerance, polyuria or polydipsia.  HEMATOLOGICAL:  No easy bruising or bleeding.     Physical Exam:  Vital Signs Last 24 Hrs  T(C): 36.5 (29 Oct 2022 05:38), Max: 36.8 (28 Oct 2022 20:13)  T(F): 97.7 (29 Oct 2022 05:38), Max: 98.2 (28 Oct 2022 20:13)  HR: 88 (29 Oct 2022 05:38) (85 - 88)  BP: 99/70 (29 Oct 2022 05:38) (99/70 - 132/82)  BP(mean): --  RR: 18 (29 Oct 2022 05:38) (18 - 20)  SpO2: 95% (29 Oct 2022 05:38) (89% - 99%)  Parameters below as of 29 Oct 2022 05:38  Patient On (Oxygen Delivery Method): room air  GEN: NAD  HEENT: normocephalic and atraumatic. EOMI. PERRL.    NECK: Supple.  No lymphadenopathy   LUNGS: Clear to auscultation.  HEART: Regular rate and rhythm without murmur.  ABDOMEN: Soft, and nondistended, mild pain in deep palpation of LLQ, Positive bowel sounds.    : No CVA tenderness  EXTREMITIES: Without any cyanosis, clubbing, rash, lesions or edema.  NEUROLOGIC: grossly intact.  PSYCHIATRIC: Appropriate affect .  SKIN: No ulceration or rash     Labs:                        13.7   7.18  )-----------( 258      ( 29 Oct 2022 06:03 )             40.9     WBC Count: 7.18 K/uL (10-29-22 @ 06:03)  WBC Count: 6.40 K/uL (10-28-22 @ 05:45)  WBC Count: 8.21 K/uL (10-27-22 @ 07:16)  WBC Count: 8.58 K/uL (10-26-22 @ 15:00)    Creatinine, Serum: 0.66 mg/dL (10-27-22 @ 07:16)  Creatinine, Serum: 0.80 mg/dL (10-26-22 @ 15:00)     COVID-19 PCR: NotDetec (10-26-22 @ 15:00)  COVID-19 PCR: NotDetec (10-13-22 @ 14:46)    All imaging and other data have been reviewed.  < from: CT Abdomen and Pelvis w/ Oral Cont and w/ IV Cont (10.25.22 @ 16:58) >  EXAM: [id number] - CT ABDOMEN AND PELVIS OC IC  - ORDERED BY: JORJE LOZADA  PROCEDURE DATE:  10/25/2022    INTERPRETATION:  CLINICAL INFORMATION: Fatigue malaise, diarrhea. Acute   diverticulitis recently.  COMPARISON: 10/13/2022.  CONTRAST/COMPLICATIONS:  IV Contrast: Omnipaque 350  90 cc administered   10 cc discarded  Oral Contrast: Omnipaque 350  Complications: None reported at time of study completion  PROCEDURE:  CT of the Abdomen and Pelvis was performed.  Sagittal and coronal reformats were performed.  FINDINGS:  LOWER CHEST: Poststernotomy changes. Elevated right hemidiaphragm.  LIVER: Within normal limits.  BILE DUCTS: Normal caliber.  GALLBLADDER: Cholelithiasis.  SPLEEN: Within normal limits.  PANCREAS: Within normal limits.  ADRENALS: Within normal limits.  KIDNEYS/URETERS: Bilateral cysts.  BLADDER: Only minimally distended and not well evaluated.  REPRODUCTIVE ORGANS: Prostate within normal limits.  BOWEL: No bowel obstruction. Appendix is normal.Small periampullary   duodenal diverticulum, containing contrast. Persistent changes of sigmoid   diverticulitis with slight interval improvement.  PERITONEUM: No ascites.  VESSELS: Within normal limits.  RETROPERITONEUM/LYMPH NODES: No lymphadenopathy.  ABDOMINAL WALL: Within normal limits.  BONES: Degenerative changes. Stable fracture of L1 vertebral body.  IMPRESSION:  Slight interval improvement in sigmoid diverticulitis. No new abnormality.    Assessment and Plan:   HPI: 48yo man with PMH of DM, asthma, lymphoma in remission was admitted with severe diarrhea. He had diverticulitis admitted at  between 10/13 and 10/18, kept on zosyn with good response, no fever and   normalized WBC with clinical improvement so was switched to PO vantin and flagyl and sent home. Today he comes with severe diarrhea in last 2 days, repeat CT on 10/25 showed slightly improved diverticulitis but  still has persistent changes of sigmoid colon with diverticulitis.   Had diarrhea but less likely c diff since he didn't have any BM since admission yesterday, most likely diverticular disease causing inflammation and diarrhea.   No leukocytosis, no fever.   GI PCR and C diff both negative     Recommendations:  - Will switch zosyn to ertapenem for more convenient dosing 1gm daily for 7days  - Will do CBC and BMP once. [facility] has been contacted   - Surgery/GI follow up after discharge  - Midline in place left arm, 10/28    Will sign off please call with any question.     Gabbi Vilchis MD  Division of Infectious Diseases   Please call ID service at [phone] with any question.      35 minutes spent on total encounter assessing patient, examination, chart review, counseling and coordinating care by the attending physician/nurse/care manager.

## 2022-11-01 PROBLEM — E78.5 HYPERLIPIDEMIA, UNSPECIFIED: Chronic | Status: ACTIVE | Noted: 2022-10-26

## 2022-11-01 PROBLEM — I10 ESSENTIAL (PRIMARY) HYPERTENSION: Chronic | Status: ACTIVE | Noted: 2022-10-26

## 2022-11-07 ENCOUNTER — NON-APPOINTMENT (OUTPATIENT)
Age: 47
End: 2022-11-07

## 2022-11-08 ENCOUNTER — APPOINTMENT (OUTPATIENT)
Dept: SURGERY | Facility: CLINIC | Age: 47
End: 2022-11-08

## 2022-11-08 ENCOUNTER — NON-APPOINTMENT (OUTPATIENT)
Age: 47
End: 2022-11-08

## 2022-11-08 VITALS
BODY MASS INDEX: 36.07 KG/M2 | TEMPERATURE: 96.7 F | HEART RATE: 72 BPM | HEIGHT: 68 IN | SYSTOLIC BLOOD PRESSURE: 120 MMHG | DIASTOLIC BLOOD PRESSURE: 78 MMHG | WEIGHT: 238 LBS | OXYGEN SATURATION: 99 %

## 2022-11-08 DIAGNOSIS — K57.32 DIVERTICULITIS OF LARGE INTESTINE W/OUT PERFORATION OR ABSCESS W/OUT BLEEDING: ICD-10-CM

## 2022-11-08 PROCEDURE — 99215 OFFICE O/P EST HI 40 MIN: CPT

## 2022-11-08 RX ORDER — LIDOCAINE 5% 700 MG/1
5 PATCH TOPICAL
Qty: 30 | Refills: 0 | Status: DISCONTINUED | COMMUNITY
Start: 2022-09-03 | End: 2022-11-08

## 2022-11-08 RX ORDER — METHOCARBAMOL 750 MG/1
750 TABLET, FILM COATED ORAL
Qty: 18 | Refills: 0 | Status: DISCONTINUED | COMMUNITY
Start: 2022-09-03 | End: 2022-11-08

## 2022-11-08 RX ORDER — NAPROXEN 500 MG/1
500 TABLET, DELAYED RELEASE ORAL
Qty: 10 | Refills: 0 | Status: DISCONTINUED | COMMUNITY
Start: 2022-09-03 | End: 2022-11-08

## 2022-11-08 RX ORDER — CEFPODOXIME PROXETIL 200 MG/1
200 TABLET, FILM COATED ORAL
Qty: 10 | Refills: 0 | Status: DISCONTINUED | COMMUNITY
Start: 2022-10-17 | End: 2022-11-08

## 2022-11-08 RX ORDER — METRONIDAZOLE 500 MG/1
500 TABLET ORAL
Qty: 15 | Refills: 0 | Status: DISCONTINUED | COMMUNITY
Start: 2022-10-17 | End: 2022-11-08

## 2022-11-08 NOTE — DATA REVIEWED
[FreeTextEntry1] : EXAM: [id number] - CT ABDOMEN AND PELVIS OC IC  - ORDERED BY: JORJE LOZADA\par \par \par PROCEDURE DATE:  10/25/2022  \par  \par \par \par INTERPRETATION:  CLINICAL INFORMATION: Fatigue malaise, diarrhea. Acute \par diverticulitis recently.\par \par COMPARISON: 10/13/2022.\par \par CONTRAST/COMPLICATIONS:\par IV Contrast: Omnipaque 350  90 cc administered   10 cc discarded\par Oral Contrast: Omnipaque 350\par Complications: None reported at time of study completion\par \par PROCEDURE:\par CT of the Abdomen and Pelvis was performed.\par Sagittal and coronal reformats were performed.\par \par FINDINGS:\par LOWER CHEST: Poststernotomy changes. Elevated right hemidiaphragm.\par \par LIVER: Within normal limits.\par BILE DUCTS: Normal caliber.\par GALLBLADDER: Cholelithiasis.\par SPLEEN: Within normal limits.\par PANCREAS: Within normal limits.\par ADRENALS: Within normal limits.\par KIDNEYS/URETERS: Bilateral cysts.\par \par BLADDER: Only minimally distended and not well evaluated.\par REPRODUCTIVE ORGANS: Prostate within normal limits.\par \par BOWEL: No bowel obstruction. Appendix is normal. Small periampullary \par duodenal diverticulum, containing contrast. Persistent changes of sigmoid \par diverticulitis with slight interval improvement.\par PERITONEUM: No ascites.\par VESSELS: Within normal limits.\par RETROPERITONEUM/LYMPH NODES: No lymphadenopathy.\par ABDOMINAL WALL: Within normal limits.\par BONES: Degenerative changes. Stable fracture of L1 vertebral body.\par \par IMPRESSION:\par Slight interval improvement in sigmoid diverticulitis. No new abnormality.\par \par \par \par --- End of Report ---\par \par \par \par \par  \par \par JENNIFER NORTON MD; Attending Radiologist \par This document has been electronically signed. Oct 25 2022  5:11PM\par

## 2022-11-08 NOTE — PHYSICAL EXAM
[Respiratory Effort] : normal respiratory effort [Normal Rate and Rhythm] : normal rate and rhythm [No HSM] : no hepatosplenomegaly [Tender] : was nontender [Enlarged] : not enlarged [No Rash or Lesion] : No rash or lesion [Alert] : alert [Oriented to Person] : oriented to person [Oriented to Place] : oriented to place [Oriented to Time] : oriented to time [Calm] : calm [de-identified] : Appears well, no acute distress, ambulates easily into the office.  [de-identified] : Remains normocephalic and atraumatic.  [de-identified] : Still supple with full range of motion.  [FreeTextEntry1] : No palpable cervical, supraclavicular, axillary or inguinal adenopathy.  [de-identified] : Deferred.  [de-identified] : Full/ obese, but soft/ non tense.\par No visible fullness in the LLQ.\par No palpable fullness to the lower abdomen.\par Entire abdomen remains non tender. [de-identified] : Deferred.  [de-identified] : Deferred.  [de-identified] : Grossly symmetric and within normal limits without motor or sensory deficit.

## 2022-11-08 NOTE — PLAN
[FreeTextEntry1] : Recent episode of significant/ persistent/ recurrent, but uncomplicated sigmoid diverticulitis.  This required readmission and prolonged course of IV ABX.  However, Mr. Whyte is comfortable and confident now without any abdominal discomfort or pain.  He is free of any ongoing GI complaints.  Vitals reassuring.  Abdominal exam benign.  No indications for interval imaging.  IV ABX complete and IV/ PICC personally removed during office visit today.  He agrees to transition towards high fiber diet.  Mr. Whyte will continue in follow-up moving forward with GI and understands that an interval colonoscopy is required.  Encouraged to call with questions/ concerns/ changes at any time.   We have also discussed and reviewed his incidental finding of cholelithiasis of his CT.  However, since he has experienced no episodes of symptoms or colic, there is no indication for intervention.  He is aware that larger meals or high fat food items may provoke symptoms and that I remain available for follow-up should there be additional concerns.  He is pleased and agrees.  Please note that more than 50% of face to face time was spent in counseling and coordination of care. \par

## 2022-11-08 NOTE — HISTORY OF PRESENT ILLNESS
[de-identified] : Very pleasant gentleman presenting to the office today in the company of his wife under direction from my office.\par Mr. Whyte mainly complains of lingering low grade fatigue and a sense of malaise.\par He denies any nausea or vomiting, but reports poor appetite.\par He is passing ongoing flatus, but mostly liquid stools.\par Mr. Whyte is presently completing his course of oral ABX and denies any abdominal pain at rest.\par However, he his main concern is that "I just don't feel too well... I thought I would be better overall by now..." [de-identified] : Very pleasant gentleman returning to the office for ongoing General Surgery follow-up care.\par Mr. Whyte is now s/p re-admission to [facility] for further IV ABX due to his significant/ persistent, but uncomplicated acute sigmoid diverticulitis.\par He is in excellent spirits today and has completed his home course of IV Invanz.\par Mr. Whyte denies any lingering abdominal discomfort or pain.\par He states that he appetite is excellent and that his stools while loose/ soft, are regular in frequency and now slowly reforming...

## 2022-12-25 PROBLEM — K57.90 DIVERTICULOSIS: Status: ACTIVE | Noted: 2022-10-25

## 2022-12-25 PROBLEM — K80.20 ASYMPTOMATIC CHOLELITHIASIS: Status: ACTIVE | Noted: 2022-11-08

## 2023-01-12 ENCOUNTER — APPOINTMENT (OUTPATIENT)
Dept: NEUROSURGERY | Facility: CLINIC | Age: 48
End: 2023-01-12
Payer: COMMERCIAL

## 2023-01-12 VITALS
BODY MASS INDEX: 35.61 KG/M2 | WEIGHT: 235 LBS | OXYGEN SATURATION: 98 % | TEMPERATURE: 97.4 F | HEART RATE: 86 BPM | SYSTOLIC BLOOD PRESSURE: 118 MMHG | DIASTOLIC BLOOD PRESSURE: 75 MMHG | HEIGHT: 68 IN

## 2023-01-12 DIAGNOSIS — R20.2 PARESTHESIA OF SKIN: ICD-10-CM

## 2023-01-12 DIAGNOSIS — S32.010A WEDGE COMPRESSION FRACTURE OF FIRST LUMBAR VERTEBRA, INITIAL ENCOUNTER FOR CLOSED FRACTURE: ICD-10-CM

## 2023-01-12 PROCEDURE — 99204 OFFICE O/P NEW MOD 45 MIN: CPT

## 2023-01-18 NOTE — ED PROVIDER NOTE - INTERNATIONAL TRAVEL
[facility] Cardiovascular Rehabilitation  Nutrition Counseling      Patient Name: Laura Harrison. Date of Birth: [id number]. MRN: [id number]      Assessment  Patient is a 81 y.o. male seen for initial MNT visit for  cardiac nutrition. Pt was not interested in receiving DM diet education. Pertinent Medical History: CABG in October 2022, CAD, HTN, HLD, stent placement, DM. \"Rate Your Plate\" initial assessment score: 41 to 57 (44) - there are some ways you can make your eating habits healthier     \"Rate Your Plate\" initial nutrition goals: None. Current knowledge on the nutrition topic: somewhat knowledgeable     Is the patient already making diet changes? No because he needed to gain weight d/t wt loss s/p CABG. Reported 20 to 22 lb unintentional wt loss but has since gained back 13 lb. Diet Recall:  What do you eat for:  - Breakfast: cereal, eggs, turkey akers, cinnamon roll   - Lunch: chicken salad sandwich   - Dinner: chicken, potatoes, vegetables  - Snacks: milk duds, jello. - Beverages: diet gingerale, water.     -Who is Responsible for Meal Preparation? wife    -Who is Responsible for Grocery Shopping? wife    -How Many Times Per Week Do You Eat Meals Outside the Home? 1 but carry out     Diagnosis  Nutrition Diagnosis: Food and nutrition-related knowledge deficit related to Lack of previous MNT/currently undergoing MNT as evidenced by Conditions associated with a diagnosis or treatment. Limited adherence to nutrition-related recommendations related to Lack of value or competing values for behavior change as evidenced by Estimated carb intake different from recommended amounts or types.     Intervention  Instructed the Patient on:   [x] Label Reading   [] Eating Out  [x] High Sodium foods    [x] Low Sodium foods  [x] High Fat foods  [x] Low Fat foods  [] Fluid Restriction   [x] Portion Sizes  [] Carb Counting   [x] Fiber  [] Other:     Educational Materials Provided:   [] Nutrition Care Manual (NCM) Heart Failure Nutrition Therapy   [] NCM Sodium Free Flavoring Tips    [] NCM Heart Healthy Food Alternatives List (low sodium, low saturated/trans fat  [] NCM Heart Healthy Nutrition Therapy   [] Heart Healthy Eating Label Reading Tips   [] Healthy Eating when Eating Out  [] Controlling Your Fluids   [] NCM Carbohydrate Counting for People with Diabetes   [x] Managing Your Diabetes Plate Method   [x] RD made cardiac rehabilitation nutrition handout using AHA, NCM resources   [] Other: NCM High calorie, high protein to promote weight gain. -General Diet Recommendations: low fat, low cholesterol, substitute healthy fats, such as olive oil, canola oil, grapeseed oil for saturated fats like butter, margarine, and shortening, minimize processed foods, reduce sodium intake, minimize simple sugars, and increase fiber intake. Monitoring & Evaluation  -Receptiveness to education/goals: Agreeable.  -Evaluation of education: Indicates understanding.  -Readiness to change: action - ready to set action plan and implement. -Expected compliance: fair. Total time involved in direct patient education: 60 minutes for initial MNT visit    Return Appointment:  [x] Your treatments are completed and no follow-up treatments are scheduled at this time.        Electronically signed by Nicole Rivera MS, RD, LD on 1/18/2023 at 12:38 PM No

## 2023-01-19 NOTE — CONSULT LETTER
[Dear  ___] : Dear  [unfilled], [Courtesy Letter:] : I had the pleasure of seeing your patient, [unfilled], in my office today. [Sincerely,] : Sincerely, [FreeTextEntry2] : Arnulfo Fuentes, \par [address] [facility] [address]\[place] [address]\par  [FreeTextEntry1] : Mr. Whyte is a very pleasant 47-year-old male patient was seen in our office today in regards to low back pain and bilateral leg symptoms.\par \par The patient experienced severe low back pain after a fall in September 2022.  The patient subsequently presented to an urgent care facility and obtained x-rays of the lumbar spine but was told there were no specific concerns.  The patient continued to have low back pain which improved and a subsequent CT scan of the abdomen demonstrated an L1 superior endplate fracture in October 2022.  The patient's low back pain continue to improve but approximately 1 week ago, the patient noticed increasing numbness in the back as well as in the lower extremities bilaterally.  The patient's numbness is primarily localized to the back and below the knee which is somewhat worse on the right than the left.  The patient does not endorse any weakness or new symptoms compatible with cauda equina syndrome.\par \par The patient's past medical history includes hypertension, diabetes, hypercholesterolemia, and a remote history of non-Hodgkin's lymphoma.  The patient denies allergies to medications.  The patient's current medical regimen includes Singulair, Breo, Spiriva, metformin, losartan, simvastatin, Wegovy, Xolair, and colonic.\par \par On examination, the patient is alert, oriented, and compliant with the exam.  The patient demonstrates full strength in the lower extremities bilaterally.  The patient demonstrates 2+ reflexes on the patella and Achilles tendons bilaterally.  The patient ambulates well.  The patient has a good range of motion of the lumbar spine.\par \par The patient is accompanied with a CT scan of the abdomen performed on October 23, 2022 which was performed for other reasons.  The patient was noted to have an L1 superior endplate fracture which appears chronic.  This endplate fracture was at least present on previous x-rays performed on September 3, 2022.\par \par Taken together, the patient has a clinical history and radiographic findings most consistent with resolving low back pain and a resolving L1 superior endplate fracture.  However, I am less clear as to the nature of the patient's numbness in the lower extremities bilaterally.  At this time, I have recommended updated imaging of the lumbar and thoracic spine to rule out structural pathology given his history of trauma.  I have also recommended EMG/nerve conduction studies to rule out a peripheral neuropathy.  The distribution of the patient's numbness is a little unusual and may reflect a peroneal neuropathy.  At this time, I have recommended follow-up with us once he has these images and tests complete so that we can review the findings with him.\par \par  [FreeTextEntry3] : Levon Christianson MD, PhD, CS, FAANS Attending Neurosurgeon  of Neurosurgery [facility] [employer] [facility] at [facility] at [address], [place] [address] Office: [phone] Fax: [phone]

## 2023-01-20 ENCOUNTER — APPOINTMENT (OUTPATIENT)
Dept: MRI IMAGING | Facility: CLINIC | Age: 48
End: 2023-01-20
Payer: COMMERCIAL

## 2023-01-20 ENCOUNTER — OUTPATIENT (OUTPATIENT)
Dept: OUTPATIENT SERVICES | Facility: HOSPITAL | Age: 48
LOS: 1 days | End: 2023-01-20
Payer: COMMERCIAL

## 2023-01-20 DIAGNOSIS — Z00.8 ENCOUNTER FOR OTHER GENERAL EXAMINATION: ICD-10-CM

## 2023-01-20 DIAGNOSIS — S32.010A WEDGE COMPRESSION FRACTURE OF FIRST LUMBAR VERTEBRA, INITIAL ENCOUNTER FOR CLOSED FRACTURE: ICD-10-CM

## 2023-01-20 DIAGNOSIS — D49.89 NEOPLASM OF UNSPECIFIED BEHAVIOR OF OTHER SPECIFIED SITES: Chronic | ICD-10-CM

## 2023-01-20 PROCEDURE — 72148 MRI LUMBAR SPINE W/O DYE: CPT | Mod: 26

## 2023-01-20 PROCEDURE — 72148 MRI LUMBAR SPINE W/O DYE: CPT

## 2023-01-20 PROCEDURE — 72146 MRI CHEST SPINE W/O DYE: CPT

## 2023-01-20 PROCEDURE — 72146 MRI CHEST SPINE W/O DYE: CPT | Mod: 26

## 2023-03-17 ENCOUNTER — APPOINTMENT (OUTPATIENT)
Dept: COLORECTAL SURGERY | Facility: CLINIC | Age: 48
End: 2023-03-17

## 2023-03-20 ENCOUNTER — RESULT REVIEW (OUTPATIENT)
Age: 48
End: 2023-03-20

## 2023-03-27 ENCOUNTER — APPOINTMENT (OUTPATIENT)
Dept: COLORECTAL SURGERY | Facility: CLINIC | Age: 48
End: 2023-03-27

## 2023-04-03 NOTE — DISCHARGE NOTE NURSING/CASE MANAGEMENT/SOCIAL WORK - NURSING SECTION COMPLETE
Patient/Caregiver provided printed discharge information.
This is a surgical and/or non-medical patient.

## 2023-06-23 NOTE — ED PROVIDER NOTE - MDM ORDERS SUBMITTED SELECTION
Subjective: (As above and below)     The patient/guardian gave verbal consent to treat. Chief Complaint   Patient presents with    Rash     Pt states it could be poison rik Pritchett Poster is a 46 y.o. female who presents for evaluation of :  35 yo female presented with CHRIS arms and leg rash after working in the yard  days ago. Denies shortness of breath. Has tried OTC creams with minimal relief. History provided by:  Patient   used: No    Rash  This is a new problem. The current episode started yesterday. The problem is unchanged. The affected locations include the right upper leg, right lower leg, left upper leg, left lower leg, left arm and right arm. The rash is characterized by redness, swelling and itchiness. She was exposed to plant contact. Pertinent negatives include no congestion, cough, diarrhea, facial edema, fatigue, fever or shortness of breath. Past treatments include antihistamine. The treatment provided mild relief. There is no history of asthma or eczema. Review of Systems   Constitutional: Negative. Negative for fatigue and fever. HENT: Negative. Negative for congestion. Eyes: Negative. Respiratory: Negative. Negative for cough and shortness of breath. Cardiovascular: Negative. Gastrointestinal: Negative. Negative for diarrhea. Skin:  Positive for rash. Review of Systems - negative except as listed above    Reviewed PmHx, RxHx, FmHx, SocHx, AllgHx and updated in chart.   Family History   Problem Relation Age of Onset    Parkinson's Disease Father     High Cholesterol Mother     Breast Cancer Mother     Cancer Mother        Past Medical History:   Diagnosis Date    Abnormal Papanicolaou smear of cervix     WNL since    Attention deficit hyperactivity disorder (ADHD), predominantly inattentive type 3/22/2021    Headache     PCOS (polycystic ovarian syndrome)     Venous thrombosis 2016    Sinus      Social History     Socioeconomic
Labs/EKG/Medications

## 2023-10-25 ENCOUNTER — TRANSCRIPTION ENCOUNTER (OUTPATIENT)
Age: 48
End: 2023-10-25

## 2023-12-27 ENCOUNTER — TRANSCRIPTION ENCOUNTER (OUTPATIENT)
Age: 48
End: 2023-12-27

## 2023-12-28 ENCOUNTER — TRANSCRIPTION ENCOUNTER (OUTPATIENT)
Age: 48
End: 2023-12-28

## 2023-12-29 ENCOUNTER — TRANSCRIPTION ENCOUNTER (OUTPATIENT)
Age: 48
End: 2023-12-29

## 2024-01-01 NOTE — PROGRESS NOTE ADULT - ASSESSMENT
As in above PA notation.  Mr. Whyte personally seen and examined during PM rounds.  "Pain all gone!"  Vitals non-suggestive.  Abdomen soft without tenderness to deep palpation of LLQ.  Labs reassuring from today.  Clinically, improving overall with ongoing advancement of diet.  Surgically, stable for present with plan for assessment for discharge in am.  To continue current supportive care with outpatient Surgery and GI follow-up. hard copy, drawn during this pregnancy

## 2024-01-02 ENCOUNTER — TRANSCRIPTION ENCOUNTER (OUTPATIENT)
Age: 49
End: 2024-01-02

## 2024-01-03 ENCOUNTER — TRANSCRIPTION ENCOUNTER (OUTPATIENT)
Age: 49
End: 2024-01-03

## 2024-01-04 NOTE — ED PROVIDER NOTE - CHIEF COMPLAINT
The patient is a 47y Male complaining of diarrhea.
PAST SURGICAL HISTORY:  No significant past surgical history

## 2024-03-14 ENCOUNTER — APPOINTMENT (OUTPATIENT)
Dept: OPHTHALMOLOGY | Facility: CLINIC | Age: 49
End: 2024-03-14
Payer: COMMERCIAL

## 2024-03-14 ENCOUNTER — NON-APPOINTMENT (OUTPATIENT)
Age: 49
End: 2024-03-14

## 2024-03-14 PROCEDURE — 92133 CPTRZD OPH DX IMG PST SGM ON: CPT

## 2024-03-14 PROCEDURE — 92004 COMPRE OPH EXAM NEW PT 1/>: CPT

## 2024-05-01 ENCOUNTER — TRANSCRIPTION ENCOUNTER (OUTPATIENT)
Age: 49
End: 2024-05-01

## 2024-08-27 ENCOUNTER — NON-APPOINTMENT (OUTPATIENT)
Age: 49
End: 2024-08-27

## 2024-09-16 ENCOUNTER — APPOINTMENT (OUTPATIENT)
Dept: OPHTHALMOLOGY | Facility: CLINIC | Age: 49
End: 2024-09-16

## 2024-10-11 ENCOUNTER — TRANSCRIPTION ENCOUNTER (OUTPATIENT)
Age: 49
End: 2024-10-11

## 2024-10-22 ENCOUNTER — APPOINTMENT (OUTPATIENT)
Dept: NEUROLOGY | Facility: CLINIC | Age: 49
End: 2024-10-22
Payer: COMMERCIAL

## 2024-10-22 VITALS
HEIGHT: 68 IN | DIASTOLIC BLOOD PRESSURE: 82 MMHG | SYSTOLIC BLOOD PRESSURE: 128 MMHG | HEART RATE: 93 BPM | BODY MASS INDEX: 30.92 KG/M2 | WEIGHT: 204 LBS

## 2024-10-22 DIAGNOSIS — R47.89 OTHER SPEECH DISTURBANCES: ICD-10-CM

## 2024-10-22 DIAGNOSIS — R41.3 OTHER AMNESIA: ICD-10-CM

## 2024-10-22 PROCEDURE — 99205 OFFICE O/P NEW HI 60 MIN: CPT

## 2024-10-22 PROCEDURE — G2211 COMPLEX E/M VISIT ADD ON: CPT

## 2024-10-22 RX ORDER — TIRZEPATIDE 15 MG/.5ML
15 INJECTION, SOLUTION SUBCUTANEOUS
Refills: 0 | Status: ACTIVE | COMMUNITY
Start: 2024-10-22

## 2024-10-22 NOTE — HISTORY OF PRESENT ILLNESS
[FreeTextEntry1] : COVID late , no symptoms. COVID VACCINE FULL.  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   HPI: In the last year, pt has had some difficulties with WF, tends to lose directions and STM has become looser.   -Memory: conversations, tasks, vacations (even remote), some names -Language: hesitant speech, at times wife has to fill the blanks -Visuo-spatial/Orientation: at times feels he picks wrong directions -Praxis: ok -Executive fx/Decision making/Attention/Multitasking: ok   -Sleep: ok, KARYNA on CPAP   -Appetite: ok, no changes   -Motor symptoms: ok   -B/B: ok   -Psychiatric symptoms: ok   -Functional status/Living Situation: lives with family  -Functional Activities Questionnaire:  Dependent = 3  Requires assistance = 2  Has difficulty but does by self = 1  Normal = 0  Never did [the activity] but could do now = 0  Never did and would have difficulty now = 1  1. Writing checks, paying bills, balancing checkbook 0 2. Assembling tax records, business affairs, or papers 0 3. Shopping alone for clothes, household necessities, or groceries 0 4. Playing a game of skill, working on a hobby 0 5. Heating water, making a cup of coffee, turning off stove after use 0 6. Preparing a balanced meal 0 7. Keeping track of current events 0 8. Paying attention to, understanding, discussing TV, book, magazine 0 9. Remembering appointments, family occasions, holidays, medications 0 10. Traveling out of neighborhood, driving, arranging to take buses 0 TOTAL SCORE:0   Anderson Index of [place] in Activities of Daily Livin. Bathing/Showerin 2. Dressin 3. Toiletin 4. Transferrin 5. Continence:1 6: Feedin   TOTAL:   Andrew-Mitesh Instrumental Activities of Daily Living: A. Ability to Use Telephone:1 B. Shoppin C. Food Preparation:1 D. Housekeepin E. Laundry:1 F. Transportation:1 G. Responsibility for Own Medications:1 H: Ability to Handle Finances:1   TOTAL:8   CDR: 0.5   -Professional status: RN, admin   PCP and other physicians: -PCP: Rogelio -Neuro: Haroon   Workup done: -MRI 2018: minimal MVD  -MRI prior (reports only): ? hamartoma L precuneus, resolved -Old FDG-PET  ? LBD.

## 2024-10-22 NOTE — PHYSICAL EXAM
[General Appearance - Alert] : alert [General Appearance - In No Acute Distress] : in no acute distress [Oriented To Time, Place, And Person] : oriented to person, place, and time [Impaired Insight] : insight and judgment were intact [Affect] : the affect was normal [Person] : oriented to person [Place] : oriented to place [Time] : oriented to time [Short Term Intact] : short term memory intact [Remote Intact] : remote memory intact [Registration Intact] : recent registration memory intact [Concentration Intact] : normal concentrating ability [Visual Intact] : visual attention was ~T not ~L decreased [Naming Objects] : no difficulty naming common objects [Repeating Phrases] : no difficulty repeating a phrase [Writing A Sentence] : no difficulty writing a sentence [Fluency] : fluency intact [Comprehension] : comprehension intact [Reading] : reading intact [Current Events] : adequate knowledge of current events [Past History] : adequate knowledge of personal past history [Vocabulary] : adequate range of vocabulary [Total Score ___ / 30] : the patient achieved a score of [unfilled] /30 [Date / Time ___ / 5] : date / time [unfilled] / 5 [Place ___ / 5] : place [unfilled] / 5 [Registration ___ / 3] : registration [unfilled] / 3 [Serial Sevens ___/5] : serial sevens [unfilled] / 5 [Naming 2 Objects ___ / 2] : naming two objects [unfilled] / 2 [Repeating a Sentence ___ / 1] : repeating a sentence [unfilled] / 1 [Writing a Sentence ___ / 1] : write sentence [unfilled] / 1 [3-stage Verbal Command ___ / 3] : three-stage verbal command [unfilled] / 3 [Written Command ___ / 1] : written command [unfilled] / 1 [Copy a Design ___ / 1] : copy a design [unfilled] / 1 [Recall ___ / 3] : recall [unfilled] / 3 [Cranial Nerves Optic (II)] : visual acuity intact bilaterally,  visual fields full to confrontation, pupils equal round and reactive to light [Cranial Nerves Oculomotor (III)] : extraocular motion intact [Cranial Nerves Trigeminal (V)] : facial sensation intact symmetrically [Cranial Nerves Facial (VII)] : face symmetrical [Cranial Nerves Vestibulocochlear (VIII)] : hearing was intact bilaterally [Cranial Nerves Glossopharyngeal (IX)] : tongue and palate midline [Cranial Nerves Accessory (XI - Cranial And Spinal)] : head turning and shoulder shrug symmetric [Cranial Nerves Hypoglossal (XII)] : there was no tongue deviation with protrusion [Motor Tone] : muscle tone was normal in all four extremities [Motor Strength] : muscle strength was normal in all four extremities [Involuntary Movements] : no involuntary movements were seen [No Muscle Atrophy] : normal bulk in all four extremities [Motor Handedness Right-Handed] : the patient is right hand dominant [Sensation Tactile Decrease] : light touch was intact [Balance] : balance was intact [Past-pointing] : there was no past-pointing [Tremor] : no tremor present [2+] : Ankle jerk left 2+ [Plantar Reflex Right Only] : normal on the right [Plantar Reflex Left Only] : normal on the left [FreeTextEntry4] : Mental Status Exam Presidents: 5/5 Alternating Pattern: ok Spiral: ok Clock: 3/3 Repetition: ok  Trail A: 30"; B: 32" Fluency: A: 15; Animals: >20 R/L discrimination on self and examiner: ok Cross-line commands: ok Praxis:  -Motor: ok -Dynamic/Luria: ok -Ideomotor/Imitation: ok -Ideational/writing/closing-in: ok -Dressing: ok Proverbs: -It's no use crying over spilled milk - ok -A rolling stone gathers no lopez - no -A chain is only as strong as its weakest link - ok -You can't teach an old dog new tricks - ok -Laughter is the best medicine - ok. RW digit span: -4-9-3 - ok -3-8-1-4 - ok -6-2-9-7-1 - ok -7-1-8-4-6-2 - ok. [Sclera] : the sclera and conjunctiva were normal [PERRL With Normal Accommodation] : pupils were equal in size, round, reactive to light, with normal accommodation [Extraocular Movements] : extraocular movements were intact [No APD] : no afferent pupillary defect [Full Visual Field] : full visual field [Outer Ear] : the ears and nose were normal in appearance [Neck Appearance] : the appearance of the neck was normal [Edema] : there was no peripheral edema [Abnormal Walk] : normal gait [] : no rash

## 2024-10-22 NOTE — REASON FOR VISIT
[Initial Evaluation] : an initial evaluation [Family Member] : family member [FreeTextEntry1] : Speech and cognitive changes

## 2024-10-22 NOTE — DATA REVIEWED
[de-identified] : -MRI 2018: minimal MVD  -MRI prior (reports only): ? hamartoma L precuneus, resolved. [de-identified] : -Old FDG-PET 2008 ? LBD.

## 2024-10-22 NOTE — ASSESSMENT
[FreeTextEntry1] : Assessment: 50yo RH WM with DM and HLD, prior ? hamartoma seen on MRI (2008 - disappeared), here for STM and WFD/speech dysfluency issues. Exam is benign. Will repeat MRI brain WOW danika due to the old lesion. NPT brief and labs.  Diagnostic Impression: -MCI  Plan: Rule out reversible and vascular causes: -B vitamins, TFT, RPR -NPT - focus on speech -MRI brain wow danika 3D -Lyme serology.

## 2024-10-30 ENCOUNTER — APPOINTMENT (OUTPATIENT)
Dept: NEUROLOGY | Facility: CLINIC | Age: 49
End: 2024-10-30
Payer: COMMERCIAL

## 2024-10-30 PROCEDURE — 96139 PSYCL/NRPSYC TST TECH EA: CPT

## 2024-10-30 PROCEDURE — 96133 NRPSYC TST EVAL PHYS/QHP EA: CPT

## 2024-10-30 PROCEDURE — 96116 NUBHVL XM PHYS/QHP 1ST HR: CPT

## 2024-10-30 PROCEDURE — 96138 PSYCL/NRPSYC TECH 1ST: CPT

## 2024-10-30 PROCEDURE — 96132 NRPSYC TST EVAL PHYS/QHP 1ST: CPT

## 2024-11-01 ENCOUNTER — APPOINTMENT (OUTPATIENT)
Dept: MRI IMAGING | Facility: CLINIC | Age: 49
End: 2024-11-01
Payer: COMMERCIAL

## 2024-11-01 PROCEDURE — 0866T QUAN MRI ALYS BRN W/DX MRI: CPT

## 2024-11-01 PROCEDURE — 70553 MRI BRAIN STEM W/O & W/DYE: CPT

## 2024-11-01 PROCEDURE — A9585: CPT

## 2025-03-20 ENCOUNTER — APPOINTMENT (OUTPATIENT)
Dept: PULMONOLOGY | Facility: CLINIC | Age: 50
End: 2025-03-20
Payer: COMMERCIAL

## 2025-03-20 VITALS
WEIGHT: 185 LBS | BODY MASS INDEX: 28.04 KG/M2 | DIASTOLIC BLOOD PRESSURE: 68 MMHG | HEIGHT: 68 IN | OXYGEN SATURATION: 98 % | HEART RATE: 91 BPM | SYSTOLIC BLOOD PRESSURE: 129 MMHG

## 2025-03-20 DIAGNOSIS — J98.6 DISORDERS OF DIAPHRAGM: ICD-10-CM

## 2025-03-20 DIAGNOSIS — J45.909 UNSPECIFIED ASTHMA, UNCOMPLICATED: ICD-10-CM

## 2025-03-20 DIAGNOSIS — G47.33 OBSTRUCTIVE SLEEP APNEA (ADULT) (PEDIATRIC): ICD-10-CM

## 2025-03-20 DIAGNOSIS — C85.90 NON-HODGKIN LYMPHOMA, UNSPECIFIED, UNSPECIFIED SITE: ICD-10-CM

## 2025-03-20 PROCEDURE — 94060 EVALUATION OF WHEEZING: CPT

## 2025-03-20 PROCEDURE — 94727 GAS DIL/WSHOT DETER LNG VOL: CPT

## 2025-03-20 PROCEDURE — 94729 DIFFUSING CAPACITY: CPT

## 2025-03-20 PROCEDURE — 99205 OFFICE O/P NEW HI 60 MIN: CPT | Mod: 25

## 2025-03-20 PROCEDURE — 71046 X-RAY EXAM CHEST 2 VIEWS: CPT

## 2025-03-20 PROCEDURE — 95012 NITRIC OXIDE EXP GAS DETER: CPT

## 2025-03-20 PROCEDURE — ZZZZZ: CPT

## 2025-03-21 NOTE — REVIEW OF SYSTEMS
[Diabetes] : diabetes [Negative] : Psychiatric [TextBox_69] : History diverticular disease history asymptomatic cholelithiasis [TextBox_91] : History of gout [TextBox_148] : History non-Hodgkin's lymphoma

## 2025-03-21 NOTE — PHYSICAL EXAM
[No Acute Distress] : no acute distress [Normal Oropharynx] : normal oropharynx [II] : Mallampati Class: II [Normal Appearance] : normal appearance [Supple] : supple [No Neck Mass] : no neck mass [No JVD] : no jvd [Normal Rate/Rhythm] : normal rate/rhythm [Normal S1, S2] : normal s1, s2 [No Murmurs] : no murmurs [No Rubs] : no rubs [No Gallops] : no gallops [No Resp Distress] : no resp distress [Normal Palpation] : normal palpation [Clear to Auscultation Bilaterally] : clear to auscultation bilaterally [Normal Rhythm and Effort] : normal rhythm and effort [No Abnormalities] : no abnormalities [Benign] : benign [Not Tender] : not tender [Soft] : soft [No HSM] : no hsm [Normal Bowel Sounds] : normal bowel sounds [Normal Gait] : normal gait [No Clubbing] : no clubbing [No Cyanosis] : no cyanosis [No Edema] : no edema [FROM] : FROM [Normal Color/ Pigmentation] : normal color/ pigmentation [No Focal Deficits] : no focal deficits [Oriented x3] : oriented x3 [Normal Affect] : normal affect [TextBox_68] : Decreased breath sounds right base correlate with chest x-ray right diaphragm dysfunction post phrenic nerve surgery

## 2025-03-21 NOTE — HISTORY OF PRESENT ILLNESS
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

## 2025-03-21 NOTE — PROCEDURE
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

## 2025-03-21 NOTE — DISCUSSION/SUMMARY
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  Notify if rescue inhaler is needed greater than 2-3 times in any week.  Avoid known triggers.

## 2025-04-11 RX ORDER — FLUTICASONE FUROATE AND VILANTEROL TRIFENATATE 200; 25 UG/1; UG/1
200-25 POWDER RESPIRATORY (INHALATION) DAILY
Qty: 3 | Refills: 3 | Status: ACTIVE | COMMUNITY
Start: 2025-04-11 | End: 1900-01-01

## 2025-08-07 ENCOUNTER — APPOINTMENT (OUTPATIENT)
Dept: PULMONOLOGY | Facility: CLINIC | Age: 50
End: 2025-08-07